# Patient Record
Sex: FEMALE | Race: WHITE | NOT HISPANIC OR LATINO | Employment: FULL TIME | URBAN - METROPOLITAN AREA
[De-identification: names, ages, dates, MRNs, and addresses within clinical notes are randomized per-mention and may not be internally consistent; named-entity substitution may affect disease eponyms.]

---

## 2017-06-16 ENCOUNTER — HOSPITAL ENCOUNTER (OUTPATIENT)
Dept: LAB | Facility: HOSPITAL | Age: 47
Setting detail: SPECIMEN
Discharge: HOME | End: 2017-06-16
Attending: INTERNAL MEDICINE | Admitting: INTERNAL MEDICINE

## 2017-06-16 LAB
ALBUMIN SERPL-MCNC: 4 G/DL (ref 3.5–4.8)
ALBUMIN/GLOB SERPL: 1.4 {RATIO} (ref 1–1.7)
ALP SERPL-CCNC: 45 IU/L (ref 32–91)
ALT SERPL-CCNC: 15 IU/L (ref 14–54)
ANION GAP SERPL CALC-SCNC: 12.6 MMOL/L (ref 10–20)
AST SERPL-CCNC: 20 IU/L (ref 15–41)
BILIRUB SERPL-MCNC: 0.9 MG/DL (ref 0.3–1.2)
BUN SERPL-MCNC: 7 MG/DL (ref 8–20)
BUN/CREAT SERPL: 7.8 (ref 5.4–26.2)
CALCIUM SERPL-MCNC: 9.3 MG/DL (ref 8.9–10.3)
CHLORIDE SERPL-SCNC: 104 MMOL/L (ref 101–111)
CHOLEST SERPL-MCNC: 154 MG/DL
CHOLEST/HDLC SERPL: 3 {RATIO}
CONV CO2: 27 MMOL/L (ref 22–32)
CONV LDL CHOLESTEROL DIRECT: 93 MG/DL (ref 0–100)
CONV MICROALBUM.,U,RANDOM: 9 MG/L
CONV TOTAL PROTEIN: 6.9 G/DL (ref 6.1–7.9)
CREAT 24H UR-MCNC: 254.7 MG/DL
CREAT UR-MCNC: 0.9 MG/DL (ref 0.4–1)
GLOBULIN UR ELPH-MCNC: 2.9 G/DL (ref 2.5–3.8)
GLUCOSE SERPL-MCNC: 114 MG/DL (ref 65–99)
HDLC SERPL-MCNC: 51 MG/DL
LDLC/HDLC SERPL: 1.8 {RATIO}
LIPID INTERPRETATION: NORMAL
MICROALBUMIN/CREAT UR: 3.5 UG/MG
POTASSIUM SERPL-SCNC: 3.6 MMOL/L (ref 3.6–5.1)
SODIUM SERPL-SCNC: 140 MMOL/L (ref 136–144)
TRIGL SERPL-MCNC: 66 MG/DL
TSH SERPL-ACNC: 1.39 UIU/ML (ref 0.34–5.6)
VLDLC SERPL CALC-MCNC: 10.4 MG/DL

## 2017-06-26 ENCOUNTER — CONVERSION ENCOUNTER (OUTPATIENT)
Dept: ENDOCRINOLOGY | Facility: CLINIC | Age: 47
End: 2017-06-26

## 2017-12-11 ENCOUNTER — HOSPITAL ENCOUNTER (OUTPATIENT)
Dept: LAB | Facility: HOSPITAL | Age: 47
Setting detail: SPECIMEN
Discharge: HOME | End: 2017-12-11
Attending: INTERNAL MEDICINE | Admitting: INTERNAL MEDICINE

## 2017-12-18 ENCOUNTER — CONVERSION ENCOUNTER (OUTPATIENT)
Dept: ENDOCRINOLOGY | Facility: CLINIC | Age: 47
End: 2017-12-18

## 2018-09-25 ENCOUNTER — HOSPITAL ENCOUNTER (OUTPATIENT)
Dept: LAB | Facility: HOSPITAL | Age: 48
Setting detail: SPECIMEN
Discharge: HOME | End: 2018-09-25
Attending: INTERNAL MEDICINE | Admitting: INTERNAL MEDICINE

## 2018-09-25 LAB
ALBUMIN SERPL-MCNC: 3.9 G/DL (ref 3.5–4.8)
ALBUMIN/GLOB SERPL: 1.3 {RATIO} (ref 1–1.7)
ALP SERPL-CCNC: 50 IU/L (ref 32–91)
ALT SERPL-CCNC: 15 IU/L (ref 14–54)
ANION GAP SERPL CALC-SCNC: 11.1 MMOL/L (ref 10–20)
AST SERPL-CCNC: 19 IU/L (ref 15–41)
BILIRUB SERPL-MCNC: 0.6 MG/DL (ref 0.3–1.2)
BUN SERPL-MCNC: 10 MG/DL (ref 8–20)
BUN/CREAT SERPL: 12.5 (ref 5.4–26.2)
CALCIUM SERPL-MCNC: 9 MG/DL (ref 8.9–10.3)
CHLORIDE SERPL-SCNC: 97 MMOL/L (ref 101–111)
CHOLEST SERPL-MCNC: 173 MG/DL
CHOLEST/HDLC SERPL: 3.6 {RATIO}
CONV CO2: 28 MMOL/L (ref 22–32)
CONV LDL CHOLESTEROL DIRECT: 109 MG/DL (ref 0–100)
CONV MICROALBUM.,U,RANDOM: 5 MG/L
CONV TOTAL PROTEIN: 6.8 G/DL (ref 6.1–7.9)
CREAT 24H UR-MCNC: 101 MG/DL
CREAT UR-MCNC: 0.8 MG/DL (ref 0.4–1)
GLOBULIN UR ELPH-MCNC: 2.9 G/DL (ref 2.5–3.8)
GLUCOSE SERPL-MCNC: 219 MG/DL (ref 65–99)
HDLC SERPL-MCNC: 47 MG/DL
LDLC/HDLC SERPL: 2.3 {RATIO}
LIPID INTERPRETATION: ABNORMAL
MICROALBUMIN/CREAT UR: 5 UG/MG
POTASSIUM SERPL-SCNC: 4.1 MMOL/L (ref 3.6–5.1)
SODIUM SERPL-SCNC: 132 MMOL/L (ref 136–144)
TRIGL SERPL-MCNC: 111 MG/DL
VLDLC SERPL CALC-MCNC: 16.2 MG/DL

## 2018-10-02 ENCOUNTER — CONVERSION ENCOUNTER (OUTPATIENT)
Dept: ENDOCRINOLOGY | Facility: CLINIC | Age: 48
End: 2018-10-02

## 2019-03-26 ENCOUNTER — HOSPITAL ENCOUNTER (OUTPATIENT)
Dept: LAB | Facility: HOSPITAL | Age: 49
Setting detail: SPECIMEN
Discharge: HOME | End: 2019-03-26
Attending: INTERNAL MEDICINE | Admitting: INTERNAL MEDICINE

## 2019-04-02 ENCOUNTER — CONVERSION ENCOUNTER (OUTPATIENT)
Dept: ENDOCRINOLOGY | Facility: CLINIC | Age: 49
End: 2019-04-02

## 2019-06-04 VITALS
SYSTOLIC BLOOD PRESSURE: 128 MMHG | SYSTOLIC BLOOD PRESSURE: 130 MMHG | WEIGHT: 187 LBS | HEIGHT: 66 IN | OXYGEN SATURATION: 98 % | HEART RATE: 99 BPM | DIASTOLIC BLOOD PRESSURE: 70 MMHG | DIASTOLIC BLOOD PRESSURE: 77 MMHG | HEIGHT: 66 IN | BODY MASS INDEX: 29.89 KG/M2 | DIASTOLIC BLOOD PRESSURE: 79 MMHG | HEART RATE: 103 BPM | WEIGHT: 178 LBS | OXYGEN SATURATION: 100 % | SYSTOLIC BLOOD PRESSURE: 120 MMHG | OXYGEN SATURATION: 98 % | SYSTOLIC BLOOD PRESSURE: 117 MMHG | HEART RATE: 81 BPM | WEIGHT: 181 LBS | DIASTOLIC BLOOD PRESSURE: 82 MMHG | HEART RATE: 89 BPM | OXYGEN SATURATION: 98 % | BODY MASS INDEX: 30.05 KG/M2 | WEIGHT: 186 LBS

## 2019-09-16 ENCOUNTER — TELEPHONE (OUTPATIENT)
Dept: ENDOCRINOLOGY | Facility: CLINIC | Age: 49
End: 2019-09-16

## 2019-09-16 NOTE — TELEPHONE ENCOUNTER
PATIENT CALLED TO GET HER LABS [facility]ENT [facility]  TO FAX HER LABS ORDERS PATIENT DID NOT KNOW FAX NUMBER SO I CALLED THE THEM  YESTERDAY AND TODAY  [phone] AND TODAY 09/17/2019

## 2019-09-18 ENCOUNTER — TELEPHONE (OUTPATIENT)
Dept: ENDOCRINOLOGY | Facility: CLINIC | Age: 49
End: 2019-09-18

## 2019-09-18 NOTE — TELEPHONE ENCOUNTER
PATIENT WANTED ME TO FAX LAB ORDERS TO [id number]  THE LAB DID CALL ME BACK AND GAVE ME THE FAX NUMBER  AND I FAX THE PATIENT'S  LAB ORDERS WITH COVER SHEET SENT 3 PAGES WAS SENT SUCCESSFUL ON 09/18/2019 AT 8:10AM

## 2019-09-19 DIAGNOSIS — I10 HYPERTENSION, UNSPECIFIED TYPE: ICD-10-CM

## 2019-09-19 DIAGNOSIS — E78.5 HYPERLIPIDEMIA, UNSPECIFIED HYPERLIPIDEMIA TYPE: ICD-10-CM

## 2019-09-19 DIAGNOSIS — E10.65 TYPE 1 DIABETES MELLITUS WITH HYPERGLYCEMIA (HCC): Primary | ICD-10-CM

## 2019-09-19 PROBLEM — Z23 ENCOUNTER FOR IMMUNIZATION: Status: ACTIVE | Noted: 2018-10-02

## 2019-09-21 ENCOUNTER — LAB (OUTPATIENT)
Dept: LAB | Facility: HOSPITAL | Age: 49
End: 2019-09-21

## 2019-09-21 DIAGNOSIS — I10 HYPERTENSION, UNSPECIFIED TYPE: ICD-10-CM

## 2019-09-21 DIAGNOSIS — E10.65 TYPE 1 DIABETES MELLITUS WITH HYPERGLYCEMIA (HCC): ICD-10-CM

## 2019-09-21 DIAGNOSIS — E78.5 HYPERLIPIDEMIA, UNSPECIFIED HYPERLIPIDEMIA TYPE: ICD-10-CM

## 2019-09-21 LAB
ALBUMIN SERPL-MCNC: 3.7 G/DL (ref 3.5–4.8)
ALBUMIN UR-MCNC: 6 MG/L
ALBUMIN/GLOB SERPL: 1.3 G/DL (ref 1–1.7)
ALP SERPL-CCNC: 48 U/L (ref 32–91)
ALT SERPL W P-5'-P-CCNC: 17 U/L (ref 14–54)
ANION GAP SERPL CALCULATED.3IONS-SCNC: 11.4 MMOL/L (ref 5–15)
ARTICHOKE IGE QN: 97 MG/DL (ref 0–100)
AST SERPL-CCNC: 16 U/L (ref 15–41)
BILIRUB SERPL-MCNC: 0.8 MG/DL (ref 0.3–1.2)
BUN BLD-MCNC: 5 MG/DL (ref 8–20)
BUN/CREAT SERPL: 5.6 (ref 5.4–26.2)
CALCIUM SPEC-SCNC: 8.5 MG/DL (ref 8.9–10.3)
CHLORIDE SERPL-SCNC: 101 MMOL/L (ref 101–111)
CHOLEST SERPL-MCNC: 150 MG/DL
CO2 SERPL-SCNC: 29 MMOL/L (ref 22–32)
CREAT BLD-MCNC: 0.9 MG/DL (ref 0.4–1)
CREAT UR-MCNC: 136.9 MG/DL
GFR SERPL CREATININE-BSD FRML MDRD: 67 ML/MIN/1.73
GLOBULIN UR ELPH-MCNC: 2.9 GM/DL (ref 2.5–3.8)
GLUCOSE BLD-MCNC: 217 MG/DL (ref 65–99)
HDLC SERPL QL: 3.06
HDLC SERPL-MCNC: 49 MG/DL
LDLC/HDLC SERPL: 1.84 {RATIO}
MICROALBUMIN/CREAT UR: 4.4 UG/MG
POTASSIUM BLD-SCNC: 4.4 MMOL/L (ref 3.6–5.1)
PROT SERPL-MCNC: 6.6 G/DL (ref 6.1–7.9)
SODIUM BLD-SCNC: 137 MMOL/L (ref 136–144)
TRIGL SERPL-MCNC: 53 MG/DL
TSH SERPL DL<=0.05 MIU/L-ACNC: 0.56 UIU/ML (ref 0.34–5.6)
VLDLC SERPL-MCNC: 10.6 MG/DL

## 2019-09-21 PROCEDURE — 80053 COMPREHEN METABOLIC PANEL: CPT

## 2019-09-21 PROCEDURE — 82570 ASSAY OF URINE CREATININE: CPT

## 2019-09-21 PROCEDURE — 84443 ASSAY THYROID STIM HORMONE: CPT

## 2019-09-21 PROCEDURE — 82043 UR ALBUMIN QUANTITATIVE: CPT

## 2019-09-21 PROCEDURE — 83036 HEMOGLOBIN GLYCOSYLATED A1C: CPT

## 2019-09-21 PROCEDURE — 80061 LIPID PANEL: CPT

## 2019-09-21 PROCEDURE — 36415 COLL VENOUS BLD VENIPUNCTURE: CPT

## 2019-09-23 LAB — HBA1C MFR BLD: 8.5 % (ref 3.5–5.6)

## 2019-09-24 RX ORDER — ATORVASTATIN CALCIUM 10 MG/1
TABLET, FILM COATED ORAL
Qty: 90 TABLET | Refills: 0 | Status: SHIPPED | OUTPATIENT
Start: 2019-09-24 | End: 2019-12-26

## 2019-10-02 ENCOUNTER — OFFICE VISIT (OUTPATIENT)
Dept: ENDOCRINOLOGY | Facility: CLINIC | Age: 49
End: 2019-10-02

## 2019-10-02 VITALS
DIASTOLIC BLOOD PRESSURE: 72 MMHG | OXYGEN SATURATION: 97 % | HEART RATE: 103 BPM | WEIGHT: 183 LBS | SYSTOLIC BLOOD PRESSURE: 114 MMHG | HEIGHT: 66 IN | BODY MASS INDEX: 29.41 KG/M2

## 2019-10-02 DIAGNOSIS — E78.5 HYPERLIPIDEMIA, UNSPECIFIED HYPERLIPIDEMIA TYPE: ICD-10-CM

## 2019-10-02 DIAGNOSIS — E55.9 VITAMIN D DEFICIENCY: ICD-10-CM

## 2019-10-02 DIAGNOSIS — E10.65 TYPE 1 DIABETES MELLITUS WITH HYPERGLYCEMIA (HCC): Primary | ICD-10-CM

## 2019-10-02 DIAGNOSIS — Z96.41 INSULIN PUMP STATUS: ICD-10-CM

## 2019-10-02 LAB — GLUCOSE BLDC GLUCOMTR-MCNC: 202 MG/DL (ref 70–130)

## 2019-10-02 PROCEDURE — 99214 OFFICE O/P EST MOD 30 MIN: CPT | Performed by: INTERNAL MEDICINE

## 2019-10-02 PROCEDURE — 82962 GLUCOSE BLOOD TEST: CPT | Performed by: INTERNAL MEDICINE

## 2019-10-02 RX ORDER — LISINOPRIL 10 MG/1
10 TABLET ORAL DAILY
COMMUNITY
Start: 2019-08-08

## 2019-10-02 RX ORDER — SYRINGE-NEEDLE,INSULIN,0.5 ML 27GX1/2"
SYRINGE, EMPTY DISPOSABLE MISCELLANEOUS
COMMUNITY
Start: 2016-06-21 | End: 2022-08-09

## 2019-10-02 RX ORDER — FLASH GLUCOSE SENSOR
1 KIT MISCELLANEOUS CONTINUOUS
Qty: 2 EACH | Refills: 12 | Status: SHIPPED | OUTPATIENT
Start: 2019-10-02 | End: 2020-06-24

## 2019-10-02 RX ORDER — FEXOFENADINE HCL 180 MG/1
180 TABLET ORAL DAILY
COMMUNITY

## 2019-10-02 RX ORDER — MONTELUKAST SODIUM 10 MG/1
10 TABLET ORAL NIGHTLY
COMMUNITY

## 2019-10-02 RX ORDER — FLASH GLUCOSE SCANNING READER
1 EACH MISCELLANEOUS CONTINUOUS
Qty: 1 DEVICE | Refills: 1 | Status: SHIPPED | OUTPATIENT
Start: 2019-10-02 | End: 2020-06-24

## 2019-10-02 NOTE — PATIENT INSTRUCTIONS
See eye doctor.  Increase exercise as planned.  Change ICR to 1:4 for breakfast & lunch.  Call if blood sugars are running under 100 or over 200.  F/u in 6 months, with labs prior.

## 2019-12-26 RX ORDER — ATORVASTATIN CALCIUM 10 MG/1
TABLET, FILM COATED ORAL
Qty: 90 TABLET | Refills: 1 | Status: SHIPPED | OUTPATIENT
Start: 2019-12-26 | End: 2020-07-13

## 2020-03-09 DIAGNOSIS — E10.65 TYPE 1 DIABETES MELLITUS WITH HYPERGLYCEMIA (HCC): Primary | ICD-10-CM

## 2020-03-10 RX ORDER — PERPHENAZINE 16 MG/1
TABLET, FILM COATED ORAL
Qty: 150 EACH | Refills: 3 | Status: SHIPPED | OUTPATIENT
Start: 2020-03-10 | End: 2020-12-21

## 2020-05-18 ENCOUNTER — TELEPHONE (OUTPATIENT)
Dept: ENDOCRINOLOGY | Facility: CLINIC | Age: 50
End: 2020-05-18

## 2020-06-02 ENCOUNTER — TELEPHONE (OUTPATIENT)
Dept: ENDOCRINOLOGY | Facility: CLINIC | Age: 50
End: 2020-06-02

## 2020-06-02 NOTE — TELEPHONE ENCOUNTER
Pt received her new Medtronic pump and needs training.  After discussion, pt prefers to do virtual training.  Emailed Fransico with [employer] to call and schedule virtual training with patient.  Will email pump training orders.

## 2020-06-16 ENCOUNTER — LAB (OUTPATIENT)
Dept: LAB | Facility: HOSPITAL | Age: 50
End: 2020-06-16

## 2020-06-16 DIAGNOSIS — E55.9 VITAMIN D DEFICIENCY: ICD-10-CM

## 2020-06-16 DIAGNOSIS — E10.65 TYPE 1 DIABETES MELLITUS WITH HYPERGLYCEMIA (HCC): ICD-10-CM

## 2020-06-16 LAB
25(OH)D3 SERPL-MCNC: 34 NG/ML (ref 30–100)
HBA1C MFR BLD: 7.8 % (ref 3.5–5.6)

## 2020-06-16 PROCEDURE — 82306 VITAMIN D 25 HYDROXY: CPT

## 2020-06-16 PROCEDURE — 83036 HEMOGLOBIN GLYCOSYLATED A1C: CPT

## 2020-06-16 PROCEDURE — 36415 COLL VENOUS BLD VENIPUNCTURE: CPT

## 2020-06-19 ENCOUNTER — TELEPHONE (OUTPATIENT)
Dept: ENDOCRINOLOGY | Facility: CLINIC | Age: 50
End: 2020-06-19

## 2020-06-19 NOTE — TELEPHONE ENCOUNTER
Fransico, [employer] CPT, emailed pt's recent [facility] reports after pt started new Medtronic and [employer]. Carelink reports scanned into phone note.

## 2020-06-24 ENCOUNTER — TELEMEDICINE (OUTPATIENT)
Dept: ENDOCRINOLOGY | Facility: CLINIC | Age: 50
End: 2020-06-24

## 2020-06-24 VITALS
HEIGHT: 66 IN | SYSTOLIC BLOOD PRESSURE: 135 MMHG | WEIGHT: 186 LBS | HEART RATE: 101 BPM | TEMPERATURE: 96.8 F | BODY MASS INDEX: 29.89 KG/M2 | DIASTOLIC BLOOD PRESSURE: 84 MMHG

## 2020-06-24 DIAGNOSIS — Z96.41 INSULIN PUMP STATUS: ICD-10-CM

## 2020-06-24 DIAGNOSIS — E78.5 HYPERLIPIDEMIA, UNSPECIFIED HYPERLIPIDEMIA TYPE: ICD-10-CM

## 2020-06-24 DIAGNOSIS — E10.65 TYPE 1 DIABETES MELLITUS WITH HYPERGLYCEMIA (HCC): Primary | ICD-10-CM

## 2020-06-24 DIAGNOSIS — E55.9 VITAMIN D DEFICIENCY: ICD-10-CM

## 2020-06-24 PROCEDURE — 99214 OFFICE O/P EST MOD 30 MIN: CPT | Performed by: INTERNAL MEDICINE

## 2020-06-24 NOTE — PATIENT INSTRUCTIONS
Keep up the good work!  Increase exercise as planned.  See eye doctor.  Continue meds & pump settings.  Call if blood sugars are running under 100 or over 200.  F/u in 6 months, with fasting labs prior.

## 2020-06-27 NOTE — PROGRESS NOTES
"[facility]        Patient Care Team:  Pallares, Clara Ann, MD as PCP - General    Chief Complaint:    Chief Complaint   Patient presents with   • Diabetes     TYPE 1-- BS 71 (FASTING)         Subjective   Here for diabetes f/u  This is a telemedicine visit in view of the covid 19 pandemic, using phone only due to problems with connection  Blood sugars: in the high 100's. New pump x 1 week. Less lows. 98% on auto mode  Insulin delivery per pump: Basal 55% / bolus 45%  Exercise program: walking, gym just reopened. Will restart  Due for eye exam    Interval History:     Patient Complaints: none  Patient Denies:  hypoglycemia  History taken from: patient    Review of Systems:   Review of Systems   Eyes: Negative for blurred vision.   Gastrointestinal: Negative for nausea.   Endocrine: Negative for polyuria.   Neurological: Negative for headache.     No wy change since last visit    Objective     Vital Signs      Vitals:    06/24/20 1206   BP: 135/84   Pulse: 101   Temp: 96.8 °F (36 °C)         Physical Exam: not done. eVisit          Results Review:    I have reviewed the patient's new clinical results, labs & imaging.    Medication Review:   Prior to Admission medications    Medication Sig Start Date End Date Taking? Authorizing Provider   atorvastatin (LIPITOR) 10 MG tablet TAKE ONE TABLET BY MOUTH DAILY 12/26/19  Yes Bernarda Henry MD   CONTOUR NEXT TEST test strip USE TO CHECK BLOOD SUGAR FOUR TIMES A DAY BEFORE MEALS AND AT BEDTIME 3/10/20  Yes Bernarda Henry MD   fexofenadine (ALLEGRA) 180 MG tablet Take 180 mg by mouth Daily.   Yes ProviderGiselle MD   Insulin Syringe-Needle U-100 (B-D INS SYR ULTRAFINE 1CC/31G) 31G X 5/16\" 1 ML misc BD INSULIN SYR ULTRAFINE II 31G X 5/16\" 1 ML 6/21/16  Yes Giselle Mcbride MD   lisinopril (PRINIVIL,ZESTRIL) 10 MG tablet 10 mg Daily. 8/8/19  Yes Giselle Mcbride MD   montelukast (SINGULAIR) 10 MG tablet Take 10 mg by mouth Every " Night.   Yes ProviderGiselle MD   NOVOLOG 100 UNIT/ML injection USE AS DIRECTED VIA INSULIN PUMP MAX DAILY DOSE OF 80 UNITS 5/29/20  Yes Bernarda Henry MD       Lab Results (most recent)     None        Lab Results   Component Value Date    HGBA1C 7.8 (H) 06/16/2020    HGBA1C 8.5 (H) 09/21/2019      Lab Results   Component Value Date    GLUCOSE 217 (H) 09/21/2019    BUN 5 (L) 09/21/2019    CREATININE 0.90 09/21/2019    EGFRIFNONA 67 09/21/2019    BCR 5.6 09/21/2019    K 4.4 09/21/2019    CO2 29.0 09/21/2019    CALCIUM 8.5 (L) 09/21/2019    ALBUMIN 3.70 09/21/2019    LABIL2 1.3 09/25/2018    AST 16 09/21/2019    ALT 17 09/21/2019    CHOL 150 09/21/2019    LDL 97 09/21/2019    HDL 49 09/21/2019    TRIG 53 09/21/2019     Lab Results   Component Value Date    TSH 0.560 09/21/2019    [id number] 34.0 06/16/2020       Assessment/Plan     Caryn was seen today for diabetes.    Diagnoses and all orders for this visit:    Type 1 diabetes mellitus with hyperglycemia (CMS/[facility])  -     Hemoglobin A1c; Future  -     Microalbumin / Creatinine Urine Ratio - Urine, Clean Catch; Future    Hyperlipidemia, unspecified hyperlipidemia type  -     Comprehensive Metabolic Panel; Future  -     Lipid Panel; Future  -     TSH; Future    Vitamin D deficiency    Insulin pump status    Wearing a 670G MiniMed insulin pump with sensorssince June 19th, 2020.    Increase exercise as planned.  See eye doctor.  Continue meds & pump settings.  Call if blood sugars are running under 100 or over 200.    Spent 25 min talking to pt.    Bernarda Henry MD  06/27/20  12:07

## 2020-07-13 RX ORDER — ATORVASTATIN CALCIUM 10 MG/1
TABLET, FILM COATED ORAL
Qty: 90 TABLET | Refills: 2 | Status: SHIPPED | OUTPATIENT
Start: 2020-07-13

## 2020-10-29 ENCOUNTER — TELEPHONE (OUTPATIENT)
Dept: ENDOCRINOLOGY | Facility: CLINIC | Age: 50
End: 2020-10-29

## 2020-10-29 NOTE — TELEPHONE ENCOUNTER
Patient called and is requesting a letter for work stating that she has a diagnosis of diabetes type 1. Please  Advise if this is ok and if youd like to add anything ?

## 2020-12-15 ENCOUNTER — LAB (OUTPATIENT)
Dept: LAB | Facility: HOSPITAL | Age: 50
End: 2020-12-15

## 2020-12-15 DIAGNOSIS — E78.5 HYPERLIPIDEMIA, UNSPECIFIED HYPERLIPIDEMIA TYPE: ICD-10-CM

## 2020-12-15 DIAGNOSIS — E10.65 TYPE 1 DIABETES MELLITUS WITH HYPERGLYCEMIA (HCC): ICD-10-CM

## 2020-12-15 LAB
ALBUMIN SERPL-MCNC: 4 G/DL (ref 3.5–5.2)
ALBUMIN UR-MCNC: <1.2 MG/DL
ALBUMIN/GLOB SERPL: 1.3 G/DL
ALP SERPL-CCNC: 66 U/L (ref 39–117)
ALT SERPL W P-5'-P-CCNC: 16 U/L (ref 1–33)
ANION GAP SERPL CALCULATED.3IONS-SCNC: 8.9 MMOL/L (ref 5–15)
AST SERPL-CCNC: 17 U/L (ref 1–32)
BILIRUB SERPL-MCNC: 0.3 MG/DL (ref 0–1.2)
BUN SERPL-MCNC: 7 MG/DL (ref 6–20)
BUN/CREAT SERPL: 8.5 (ref 7–25)
CALCIUM SPEC-SCNC: 8.9 MG/DL (ref 8.6–10.5)
CHLORIDE SERPL-SCNC: 101 MMOL/L (ref 98–107)
CHOLEST SERPL-MCNC: 157 MG/DL (ref 0–200)
CO2 SERPL-SCNC: 27.1 MMOL/L (ref 22–29)
CREAT SERPL-MCNC: 0.82 MG/DL (ref 0.57–1)
CREAT UR-MCNC: 132.4 MG/DL
GFR SERPL CREATININE-BSD FRML MDRD: 74 ML/MIN/1.73
GLOBULIN UR ELPH-MCNC: 3.1 GM/DL
GLUCOSE SERPL-MCNC: 190 MG/DL (ref 65–99)
HBA1C MFR BLD: 7.5 % (ref 3.5–5.6)
HDLC SERPL-MCNC: 43 MG/DL (ref 40–60)
LDLC SERPL CALC-MCNC: 93 MG/DL (ref 0–100)
LDLC/HDLC SERPL: 2.1 {RATIO}
MICROALBUMIN/CREAT UR: NORMAL MG/G{CREAT}
POTASSIUM SERPL-SCNC: 4.3 MMOL/L (ref 3.5–5.2)
PROT SERPL-MCNC: 7.1 G/DL (ref 6–8.5)
SODIUM SERPL-SCNC: 137 MMOL/L (ref 136–145)
TRIGL SERPL-MCNC: 118 MG/DL (ref 0–150)
TSH SERPL DL<=0.05 MIU/L-ACNC: 0.75 UIU/ML (ref 0.27–4.2)
VLDLC SERPL-MCNC: 21 MG/DL (ref 5–40)

## 2020-12-15 PROCEDURE — 80061 LIPID PANEL: CPT

## 2020-12-15 PROCEDURE — 80053 COMPREHEN METABOLIC PANEL: CPT

## 2020-12-15 PROCEDURE — 82570 ASSAY OF URINE CREATININE: CPT

## 2020-12-15 PROCEDURE — 83036 HEMOGLOBIN GLYCOSYLATED A1C: CPT

## 2020-12-15 PROCEDURE — 84443 ASSAY THYROID STIM HORMONE: CPT

## 2020-12-15 PROCEDURE — 82043 UR ALBUMIN QUANTITATIVE: CPT

## 2020-12-15 PROCEDURE — 36415 COLL VENOUS BLD VENIPUNCTURE: CPT

## 2020-12-20 DIAGNOSIS — E10.65 TYPE 1 DIABETES MELLITUS WITH HYPERGLYCEMIA (HCC): ICD-10-CM

## 2020-12-21 ENCOUNTER — OFFICE VISIT (OUTPATIENT)
Dept: ENDOCRINOLOGY | Facility: CLINIC | Age: 50
End: 2020-12-21

## 2020-12-21 VITALS
HEIGHT: 66 IN | BODY MASS INDEX: 32.78 KG/M2 | SYSTOLIC BLOOD PRESSURE: 126 MMHG | WEIGHT: 204 LBS | TEMPERATURE: 96.9 F | DIASTOLIC BLOOD PRESSURE: 74 MMHG | HEART RATE: 94 BPM | OXYGEN SATURATION: 98 %

## 2020-12-21 DIAGNOSIS — E10.65 TYPE 1 DIABETES MELLITUS WITH HYPERGLYCEMIA (HCC): Primary | ICD-10-CM

## 2020-12-21 DIAGNOSIS — Z96.41 INSULIN PUMP STATUS: ICD-10-CM

## 2020-12-21 DIAGNOSIS — E55.9 VITAMIN D DEFICIENCY: ICD-10-CM

## 2020-12-21 DIAGNOSIS — E78.5 HYPERLIPIDEMIA, UNSPECIFIED HYPERLIPIDEMIA TYPE: ICD-10-CM

## 2020-12-21 LAB — GLUCOSE BLDC GLUCOMTR-MCNC: 121 MG/DL (ref 70–105)

## 2020-12-21 PROCEDURE — 82962 GLUCOSE BLOOD TEST: CPT | Performed by: INTERNAL MEDICINE

## 2020-12-21 PROCEDURE — 99214 OFFICE O/P EST MOD 30 MIN: CPT | Performed by: INTERNAL MEDICINE

## 2020-12-21 RX ORDER — PERPHENAZINE 16 MG/1
TABLET, FILM COATED ORAL
Qty: 150 EACH | Refills: 12 | Status: SHIPPED | OUTPATIENT
Start: 2020-12-21 | End: 2022-01-13

## 2020-12-21 NOTE — PATIENT INSTRUCTIONS
Keep up the good work!  Increase exercise as planned..  Continue Chol meds & vit D supplements.  Increase basal rate to 1.7 units/h from 12p-3p.  Call if blood sugars are running under 100 or over 200.  F/u in 6 months, with labs prior.

## 2020-12-28 NOTE — PROGRESS NOTES
[facility]        Patient Care Team:  Pallares, Clara Ann, MD as PCP - General    Chief Complaint:    Chief Complaint   Patient presents with   • Diabetes     type 1, bs 121,pp 1.5 hrs, basal rates 12a 1.40, 3a 2.05, 6a 1.10, 9a 1.90, 12p 1.50, 3p 1.70, 7p 2.00          Subjective   Here for diabetes f/u  Blood sugars: higher in the afternoon  Insulin delivery per pump: Basal 60% / bolus 40%  Exercise program: less. Working from home  Taking vit D    Interval History:     Patient Complaints: none  Patient Denies:  hypoglycemia  History taken from: patient    Review of Systems:   Review of Systems   Constitutional: Positive for unexpected weight gain.   Eyes: Negative for blurred vision.   Gastrointestinal: Negative for nausea.   Endocrine: Negative for polyuria.   Neurological: Negative for headache.     Gained 18 lb since last visit    Objective     Vital Signs      Vitals:    12/21/20 1259   BP: 126/74   Pulse: 94   Temp: 96.9 °F (36.1 °C)   SpO2: 98%         Physical Exam:     General Appearance:    Alert, cooperative, in no acute distress. Obese   Head:    Normocephalic, without obvious abnormality, atraumatic   Eyes:            Lids and lashes normal, conjunctivae and sclerae normal, no   icterus, no pallor, corneas clear, PERRLA   Throat:   No oral lesions,  oral mucosa moist   Neck:   No adenopathy, supple,  no thyromegaly, no   carotid bruit   Lungs:     Clear     Heart:    Regular rhythm and normal rate   Chest Wall:    No abnormalities observed   Abdomen:     Normal bowel sounds, soft                 Extremities:   Moves all extremities well, no edema               Pulses:   Pulses palpable and equal bilaterally   Skin:   Pump site clean   Neurologic:  DTR 1+ in ankles, normal vibratory sense, able to feel the 10g monofilament          Results Review:    I have reviewed the patient's new clinical results, labs & imaging.    Medication Review:   Prior to Admission medications   "  Medication Sig Start Date End Date Taking? Authorizing Provider   atorvastatin (LIPITOR) 10 MG tablet TAKE ONE TABLET BY MOUTH DAILY 7/13/20  Yes Bernarda Henry MD   fexofenadine (ALLEGRA) 180 MG tablet Take 180 mg by mouth Daily.   Yes Giselle Mcbride MD   Insulin Syringe-Needle U-100 (B-D INS SYR ULTRAFINE 1CC/31G) 31G X 5/16\" 1 ML misc BD INSULIN SYR ULTRAFINE II 31G X 5/16\" 1 ML 6/21/16  Yes Giselle Mcbride MD   lisinopril (PRINIVIL,ZESTRIL) 10 MG tablet 10 mg Daily. 8/8/19  Yes Giselle Mcbride MD   montelukast (SINGULAIR) 10 MG tablet Take 10 mg by mouth Every Night.   Yes Giselle Mcbride MD   NovoLOG 100 UNIT/ML injection USE AS DIRECTED VIA INSULIN PUMP; MAXIMUM DOSE OF 80 UNITS PER DAY 12/16/20  Yes Bernarda Henry MD   Contour Next Test test strip CHECK BLOOD SUGAR FOUR TIMES A DAY BEFORE MEALS AND AT BEDTIME 12/21/20   Bernarda Henry MD       Lab Results (most recent)     Procedure Component Value Units Date/Time    POC Glucose [[id number]]  (Abnormal) Collected: 12/21/20 1257    Specimen: Blood Updated: 12/21/20 1300     Glucose 121 mg/dL      Comment: Serial Number: [id number]:  [id number]               Lab Results   Component Value Date    HGBA1C 7.5 (H) 12/15/2020    HGBA1C 7.8 (H) 06/16/2020    HGBA1C 8.5 (H) 09/21/2019      Lab Results   Component Value Date    GLUCOSE 190 (H) 12/15/2020    BUN 7 12/15/2020    CREATININE 0.82 12/15/2020    EGFRIFNONA 74 12/15/2020    BCR 8.5 12/15/2020    K 4.3 12/15/2020    CO2 27.1 12/15/2020    CALCIUM 8.9 12/15/2020    ALBUMIN 4.00 12/15/2020    LABIL2 1.3 09/25/2018    AST 17 12/15/2020    ALT 16 12/15/2020    CHOL 157 12/15/2020    LDL 93 12/15/2020    HDL 43 12/15/2020    TRIG 118 12/15/2020     Lab Results   Component Value Date    TSH 0.746 12/15/2020    [id number] 34.0 06/16/2020       Assessment/Plan     Diagnoses and all orders for this visit:    1. Type 1 diabetes mellitus with hyperglycemia (CMS/[facility]) (Primary) - " improved  -     Hemoglobin A1c; Future    2. Vitamin D deficiency - will check status next visit  -     Vitamin D 25 Hydroxy; Future    3. Hyperlipidemia, unspecified hyperlipidemia type - stable    4. Insulin pump status    Other orders  -     POC Glucose    Wearing a 670G MiniMed insulin pump with sensors since June 19th, 2020.    Increase exercise as planned..  Continue chol meds & vit D supplements.  Increase basal rate to 1.7 units/h from 12p-3p.  Call if blood sugars are running under 100 or over 200.        Bernarda Henry MD  12/27/20  19:04 EST

## 2021-03-20 NOTE — TELEPHONE ENCOUNTER
Patient called for NovoLog refills, refill for NovoLog while sent to [facility] in [facility].  Patient notified.

## 2021-04-02 ENCOUNTER — TELEPHONE (OUTPATIENT)
Dept: ENDOCRINOLOGY | Facility: CLINIC | Age: 51
End: 2021-04-02

## 2021-04-02 ENCOUNTER — BULK ORDERING (OUTPATIENT)
Dept: CASE MANAGEMENT | Facility: OTHER | Age: 51
End: 2021-04-02

## 2021-04-02 DIAGNOSIS — Z23 IMMUNIZATION DUE: ICD-10-CM

## 2021-04-21 ENCOUNTER — TELEPHONE (OUTPATIENT)
Dept: ENDOCRINOLOGY | Facility: CLINIC | Age: 51
End: 2021-04-21

## 2021-04-21 NOTE — TELEPHONE ENCOUNTER
Scanned into phone note. Talked with pt about issues she has been having with her pump (error messages, having to change her infusion set 3-4 times a day). Advised pt call [employer] and explain the issue.

## 2021-05-19 ENCOUNTER — PATIENT MESSAGE (OUTPATIENT)
Dept: ENDOCRINOLOGY | Facility: CLINIC | Age: 51
End: 2021-05-19

## 2021-05-19 DIAGNOSIS — E10.65 UNCONTROLLED TYPE 1 DIABETES MELLITUS WITH HYPERGLYCEMIA (HCC): Primary | ICD-10-CM

## 2021-05-29 DIAGNOSIS — E10.65 UNCONTROLLED TYPE 1 DIABETES MELLITUS WITH HYPERGLYCEMIA (HCC): ICD-10-CM

## 2021-06-14 ENCOUNTER — LAB (OUTPATIENT)
Dept: LAB | Facility: HOSPITAL | Age: 51
End: 2021-06-14

## 2021-06-14 DIAGNOSIS — E10.65 TYPE 1 DIABETES MELLITUS WITH HYPERGLYCEMIA (HCC): ICD-10-CM

## 2021-06-14 DIAGNOSIS — E55.9 VITAMIN D DEFICIENCY: ICD-10-CM

## 2021-06-14 LAB
25(OH)D3 SERPL-MCNC: 36.5 NG/ML
HBA1C MFR BLD: 7.6 % (ref 3.5–5.6)

## 2021-06-14 PROCEDURE — 83036 HEMOGLOBIN GLYCOSYLATED A1C: CPT

## 2021-06-14 PROCEDURE — 82306 VITAMIN D 25 HYDROXY: CPT

## 2021-06-14 PROCEDURE — 36415 COLL VENOUS BLD VENIPUNCTURE: CPT

## 2021-06-21 ENCOUNTER — OFFICE VISIT (OUTPATIENT)
Dept: ENDOCRINOLOGY | Facility: CLINIC | Age: 51
End: 2021-06-21

## 2021-06-21 VITALS
DIASTOLIC BLOOD PRESSURE: 70 MMHG | TEMPERATURE: 97.3 F | OXYGEN SATURATION: 98 % | HEIGHT: 66 IN | BODY MASS INDEX: 32.47 KG/M2 | HEART RATE: 108 BPM | WEIGHT: 202 LBS | SYSTOLIC BLOOD PRESSURE: 110 MMHG

## 2021-06-21 DIAGNOSIS — E78.5 HYPERLIPIDEMIA, UNSPECIFIED HYPERLIPIDEMIA TYPE: ICD-10-CM

## 2021-06-21 DIAGNOSIS — Z96.41 INSULIN PUMP STATUS: ICD-10-CM

## 2021-06-21 DIAGNOSIS — E55.9 VITAMIN D DEFICIENCY: ICD-10-CM

## 2021-06-21 DIAGNOSIS — E10.65 TYPE 1 DIABETES MELLITUS WITH HYPERGLYCEMIA (HCC): Primary | ICD-10-CM

## 2021-06-21 LAB — GLUCOSE BLDC GLUCOMTR-MCNC: 176 MG/DL (ref 70–105)

## 2021-06-21 PROCEDURE — 82962 GLUCOSE BLOOD TEST: CPT | Performed by: INTERNAL MEDICINE

## 2021-06-21 PROCEDURE — 99214 OFFICE O/P EST MOD 30 MIN: CPT | Performed by: INTERNAL MEDICINE

## 2021-06-21 NOTE — PATIENT INSTRUCTIONS
Keep up the good work!  See eye doctor as scheduled.  Continue exercise.  Continue chol meds & vit D supplements.  Decrease basal rate to 1.5 units/h from 7p-12a.  Bolus 5-10 min before eating.  Call if blood sugars are running under 100 or over 200.  F/u in 6 months, with fasting labs prior.

## 2021-06-30 NOTE — PROGRESS NOTES
[facility]        Patient Care Team:  Pallares, Clara Ann, MD as PCP - General    Chief Complaint:    Chief Complaint   Patient presents with   • Diabetes     Type 1,  1.5hr PP, Basal 12am 1.4, 3am 2.05, 6am 1.1, 9am 1.9, 12pm 1.7, 7pm 2.0         Subjective   Here for diabetes f/u  Blood sugars: some lows ~ 1-2 am  Exercise program: walking  Taking vit D  Due for eye exam    Interval History:     Patient Complaints: none  Patient Denies:  Severe hypoglycemia  History taken from: patient    Review of Systems:   Review of Systems   Eyes: Negative for blurred vision.   Gastrointestinal: Negative for nausea.   Endocrine: Negative for polyuria.   Neurological: Negative for headache.     Lost  2 lb since last visit    Objective     Vital Signs      Vitals:    06/21/21 1323   BP: 110/70   Pulse: 108   Temp: 97.3 °F (36.3 °C)   SpO2: 98%         Physical Exam:     General Appearance:    Alert, cooperative, in no acute distress. Obese   Head:    Normocephalic, without obvious abnormality, atraumatic   Eyes:            Lids and lashes normal, conjunctivae and sclerae normal, no   icterus, no pallor, corneas clear, PERRLA   Throat:   No oral lesions,  oral mucosa moist   Neck:   No adenopathy, supple,  no thyromegaly, no   carotid bruit   Lungs:     Clear    Heart:    Regular rhythm and normal rate   Chest Wall:    No abnormalities observed   Abdomen:     Normal bowel sounds, soft                 Extremities:   Moves all extremities well, no edema               Pulses:   Pulses palpable and equal bilaterally   Skin:   Dry. No bruising or rash   Neurologic:  DTR 1+, able to feel the 10g monofilament          Results Review:    I have reviewed the patient's new clinical results, labs & imaging.    Medication Review:   Prior to Admission medications    Medication Sig Start Date End Date Taking? Authorizing Provider   atorvastatin (LIPITOR) 10 MG tablet TAKE ONE TABLET BY MOUTH DAILY 7/13/20  Yes  "Bernarda Henry MD   Contour Next Test test strip CHECK BLOOD SUGAR FOUR TIMES A DAY BEFORE MEALS AND AT BEDTIME 12/21/20  Yes Bernarda Henry MD   fexofenadine (ALLEGRA) 180 MG tablet Take 180 mg by mouth Daily.   Yes Giselle Mcbride MD   Insulin Syringe-Needle U-100 (B-D INS SYR ULTRAFINE 1CC/31G) 31G X 5/16\" 1 ML misc BD INSULIN SYR ULTRAFINE II 31G X 5/16\" 1 ML 6/21/16  Yes Giselle Mcbride MD   lisinopril (PRINIVIL,ZESTRIL) 10 MG tablet 10 mg Daily. 8/8/19  Yes Giselle Mcbride MD   montelukast (SINGULAIR) 10 MG tablet Take 10 mg by mouth Every Night.   Yes Giselle Mcbride MD   NovoLOG 100 UNIT/ML injection Pt to use as directed in insulin pump. MDD: 100 5/20/21  Yes Bernarda Henry MD   Nutritional Supplements (VITAMIN D BOOSTER PO) Take  by mouth.   Yes Giselle Mcbride MD       Lab Results (most recent)     Procedure Component Value Units Date/Time    POC Glucose [[id number]]  (Abnormal) Collected: 06/21/21 1327    Specimen: Blood Updated: 06/21/21 1328     Glucose 176 mg/dL      Comment: Serial Number: [id number]:  [id number]           Lab Results   Component Value Date    HGBA1C 7.6 (H) 06/14/2021    HGBA1C 7.5 (H) 12/15/2020    HGBA1C 7.8 (H) 06/16/2020      Lab Results   Component Value Date    GLUCOSE 190 (H) 12/15/2020    BUN 7 12/15/2020    CREATININE 0.82 12/15/2020    EGFRIFNONA 74 12/15/2020    BCR 8.5 12/15/2020    K 4.3 12/15/2020    CO2 27.1 12/15/2020    CALCIUM 8.9 12/15/2020    ALBUMIN 4.00 12/15/2020    LABIL2 1.3 09/25/2018    AST 17 12/15/2020    ALT 16 12/15/2020    CHOL 157 12/15/2020    LDL 93 12/15/2020    HDL 43 12/15/2020    TRIG 118 12/15/2020     Lab Results   Component Value Date    TSH 0.746 12/15/2020    MYCH76JB 36.5 06/14/2021       Assessment/Plan     Diagnoses and all orders for this visit:    1. Type 1 diabetes mellitus with hyperglycemia (CMS/[facility]) (Primary) - stable  -     Hemoglobin A1c; Future  -     Microalbumin / Creatinine " Urine Ratio - Urine, Clean Catch; Future    2. Vitamin D deficiency - stable    3. Hyperlipidemia, unspecified hyperlipidemia type - will check status next visit  -     Comprehensive Metabolic Panel; Future  -     Lipid Panel; Future  -     TSH; Future    4. Insulin pump status    Other orders  -     POC Glucose    Wearing a 670G MiniMed insulin pump with sensors since June 19th, 2020.    See eye doctor as scheduled.  Continue exercise.  Continue chol meds & vit D supplements.  Decrease basal rate to 1.5 units/h from 7p-12a.  Bolus 5-10 min before eating.  Call if blood sugars are running under 100 or over 200.        Bernarda Henry MD  06/30/21  17:11 EDT

## 2021-07-02 ENCOUNTER — TELEPHONE (OUTPATIENT)
Dept: ENDOCRINOLOGY | Facility: CLINIC | Age: 51
End: 2021-07-02

## 2021-09-24 DIAGNOSIS — E10.65 UNCONTROLLED TYPE 1 DIABETES MELLITUS WITH HYPERGLYCEMIA (HCC): ICD-10-CM

## 2021-12-22 ENCOUNTER — LAB (OUTPATIENT)
Dept: LAB | Facility: HOSPITAL | Age: 51
End: 2021-12-22

## 2021-12-22 DIAGNOSIS — E10.65 TYPE 1 DIABETES MELLITUS WITH HYPERGLYCEMIA (HCC): ICD-10-CM

## 2021-12-22 DIAGNOSIS — E78.5 HYPERLIPIDEMIA, UNSPECIFIED HYPERLIPIDEMIA TYPE: ICD-10-CM

## 2021-12-22 LAB
ALBUMIN SERPL-MCNC: 4.1 G/DL (ref 3.5–5.2)
ALBUMIN UR-MCNC: 1.9 MG/DL
ALBUMIN/GLOB SERPL: 1.6 G/DL
ALP SERPL-CCNC: 68 U/L (ref 39–117)
ALT SERPL W P-5'-P-CCNC: 24 U/L (ref 1–33)
ANION GAP SERPL CALCULATED.3IONS-SCNC: 9.2 MMOL/L (ref 5–15)
AST SERPL-CCNC: 17 U/L (ref 1–32)
BILIRUB SERPL-MCNC: 0.3 MG/DL (ref 0–1.2)
BUN SERPL-MCNC: 8 MG/DL (ref 6–20)
BUN/CREAT SERPL: 9.5 (ref 7–25)
CALCIUM SPEC-SCNC: 9.1 MG/DL (ref 8.6–10.5)
CHLORIDE SERPL-SCNC: 99 MMOL/L (ref 98–107)
CHOLEST SERPL-MCNC: 146 MG/DL (ref 0–200)
CO2 SERPL-SCNC: 29.8 MMOL/L (ref 22–29)
CREAT SERPL-MCNC: 0.84 MG/DL (ref 0.57–1)
CREAT UR-MCNC: 120.1 MG/DL
GFR SERPL CREATININE-BSD FRML MDRD: 72 ML/MIN/1.73
GLOBULIN UR ELPH-MCNC: 2.5 GM/DL
GLUCOSE SERPL-MCNC: 201 MG/DL (ref 65–99)
HBA1C MFR BLD: 7.5 % (ref 3.5–5.6)
HDLC SERPL-MCNC: 39 MG/DL (ref 40–60)
LDLC SERPL CALC-MCNC: 81 MG/DL (ref 0–100)
LDLC/HDLC SERPL: 1.99 {RATIO}
MICROALBUMIN/CREAT UR: 15.8 MG/G
POTASSIUM SERPL-SCNC: 3.9 MMOL/L (ref 3.5–5.2)
PROT SERPL-MCNC: 6.6 G/DL (ref 6–8.5)
SODIUM SERPL-SCNC: 138 MMOL/L (ref 136–145)
TRIGL SERPL-MCNC: 146 MG/DL (ref 0–150)
TSH SERPL DL<=0.05 MIU/L-ACNC: 0.88 UIU/ML (ref 0.27–4.2)
VLDLC SERPL-MCNC: 26 MG/DL (ref 5–40)

## 2021-12-22 PROCEDURE — 82043 UR ALBUMIN QUANTITATIVE: CPT

## 2021-12-22 PROCEDURE — 83036 HEMOGLOBIN GLYCOSYLATED A1C: CPT

## 2021-12-22 PROCEDURE — 80053 COMPREHEN METABOLIC PANEL: CPT

## 2021-12-22 PROCEDURE — 80061 LIPID PANEL: CPT

## 2021-12-22 PROCEDURE — 84443 ASSAY THYROID STIM HORMONE: CPT

## 2021-12-22 PROCEDURE — 36415 COLL VENOUS BLD VENIPUNCTURE: CPT

## 2021-12-22 PROCEDURE — 82570 ASSAY OF URINE CREATININE: CPT

## 2021-12-29 ENCOUNTER — OFFICE VISIT (OUTPATIENT)
Dept: ENDOCRINOLOGY | Facility: CLINIC | Age: 51
End: 2021-12-29

## 2021-12-29 VITALS
TEMPERATURE: 96.9 F | DIASTOLIC BLOOD PRESSURE: 90 MMHG | HEIGHT: 66 IN | SYSTOLIC BLOOD PRESSURE: 140 MMHG | WEIGHT: 190.8 LBS | BODY MASS INDEX: 30.67 KG/M2 | HEART RATE: 96 BPM

## 2021-12-29 DIAGNOSIS — Z96.41 INSULIN PUMP STATUS: ICD-10-CM

## 2021-12-29 DIAGNOSIS — E10.65 TYPE 1 DIABETES MELLITUS WITH HYPERGLYCEMIA: Primary | ICD-10-CM

## 2021-12-29 DIAGNOSIS — E78.5 HYPERLIPIDEMIA, UNSPECIFIED HYPERLIPIDEMIA TYPE: ICD-10-CM

## 2021-12-29 DIAGNOSIS — E55.9 VITAMIN D DEFICIENCY: ICD-10-CM

## 2021-12-29 LAB — GLUCOSE BLDC GLUCOMTR-MCNC: 124 MG/DL (ref 70–105)

## 2021-12-29 PROCEDURE — 82962 GLUCOSE BLOOD TEST: CPT | Performed by: INTERNAL MEDICINE

## 2021-12-29 PROCEDURE — 99214 OFFICE O/P EST MOD 30 MIN: CPT | Performed by: INTERNAL MEDICINE

## 2021-12-29 RX ORDER — CALCIPOTRIENE AND BETAMETHASONE DIPROPIONATE 50; .5 UG/G; MG/G
SUSPENSION TOPICAL
COMMUNITY
Start: 2021-12-14

## 2021-12-29 RX ORDER — GLUCAGON 3 MG/1
3 POWDER NASAL AS NEEDED
Qty: 1 EACH | Refills: 1 | Status: SHIPPED | OUTPATIENT
Start: 2021-12-29

## 2022-01-08 NOTE — PROGRESS NOTES
[facility]        Patient Care Team:  Pallares, Clara Ann, MD as PCP - General    Chief Complaint:    Chief Complaint   Patient presents with   • Diabetes     follow up, Type 1,  2.5hr PP, Basal 12am 1.4, 3am 1.9, 6am 1.1, 9am 1.9, 12pm 1.7, 7 pm 1.5         Subjective   Here for diabetes f/u  Blood sugars: higher after eating  Insulin delivery per pump: Basal 61% / bolus 39%  Exercise program: walking @ work  Taking vit D    Interval History:     Patient Complaints: none  Patient Denies:  hypoglycemia  History taken from: patient    Review of Systems:   Review of Systems   Eyes: Negative for blurred vision.   Gastrointestinal: Negative for nausea.   Endocrine: Negative for polyuria.   Neurological: Negative for headache.     Lost  12 lb since last visit    Objective     Vital Signs      Vitals:    12/29/21 1522   BP: 140/90   Pulse: 96   Temp: 96.9 °F (36.1 °C)         Physical Exam:     General Appearance:    Alert, cooperative, in no acute distress   Head:    Normocephalic, without obvious abnormality, atraumatic   Eyes:            Lids and lashes normal, conjunctivae and sclerae normal, no   icterus, no pallor, corneas clear, PERRLA   Throat:   No oral lesions,  oral mucosa moist   Neck:   No adenopathy, supple,  no thyromegaly, no   carotid bruit   Lungs:     Clear     Heart:    Regular rhythm and normal rate   Chest Wall:    No abnormalities observed   Abdomen:     Normal bowel sounds, soft                 Extremities:   Moves all extremities well, no edema               Pulses:   Pulses palpable and equal bilaterally   Skin:   Pump site clean   Neurologic:  DTR 1+ in ankles, normal vibratory sense, able to feel the 10g monofilament          Results Review:    I have reviewed the patient's new clinical results, labs & imaging.    Medication Review:   Prior to Admission medications    Medication Sig Start Date End Date Taking? Authorizing Provider   atorvastatin (LIPITOR) 10 MG  "tablet TAKE ONE TABLET BY MOUTH DAILY 7/13/20   Bernarda eHnry MD   Contour Next Test test strip CHECK BLOOD SUGAR FOUR TIMES A DAY BEFORE MEALS AND AT BEDTIME 12/21/20   Bernarda Henry MD   fexofenadine (ALLEGRA) 180 MG tablet Take 180 mg by mouth Daily.    Giselle Mcbride MD   Glucagon (Baqsimi One Pack) 3 MG/DOSE powder 3 mg into the nostril(s) as directed by provider As Needed (for severe hypoglycemic episode). 12/29/21   Bernarda Henry MD   Insulin Syringe-Needle U-100 (B-D INS SYR ULTRAFINE 1CC/31G) 31G X 5/16\" 1 ML misc BD INSULIN SYR ULTRAFINE II 31G X 5/16\" 1 ML 6/21/16   Giselle Mcbride MD   lisinopril (PRINIVIL,ZESTRIL) 10 MG tablet 10 mg Daily. 8/8/19   Giselle Mcbride MD   montelukast (SINGULAIR) 10 MG tablet Take 10 mg by mouth Every Night.    Giselle Mcbride MD   NovoLOG 100 UNIT/ML injection USE AS DIRECTED IN INSULIN PUMP; MAXIMUM DAILY DOSE 100 UNITS 9/24/21   Bernarda Henry MD   Nutritional Supplements (VITAMIN D BOOSTER PO) Take  by mouth.    Giselle Mcbride MD   Taclonex 0.005-0.064 % external suspension  12/14/21   Giselle Mcbride MD       Lab Results (most recent)     Procedure Component Value Units Date/Time    POC Glucose [[id number]]  (Abnormal) Collected: 12/29/21 1526    Specimen: Blood Updated: 12/29/21 1527     Glucose 124 mg/dL      Comment: Serial Number: [id number]:  [id number]           Lab Results   Component Value Date    HGBA1C 7.5 (H) 12/22/2021    HGBA1C 7.6 (H) 06/14/2021    HGBA1C 7.5 (H) 12/15/2020      Lab Results   Component Value Date    GLUCOSE 201 (H) 12/22/2021    BUN 8 12/22/2021    CREATININE 0.84 12/22/2021    EGFRIFNONA 72 12/22/2021    BCR 9.5 12/22/2021    K 3.9 12/22/2021    CO2 29.8 (H) 12/22/2021    CALCIUM 9.1 12/22/2021    ALBUMIN 4.10 12/22/2021    LABIL2 1.3 09/25/2018    AST 17 12/22/2021    ALT 24 12/22/2021    CHOL 146 12/22/2021    LDL 81 12/22/2021    HDL 39 (L) 12/22/2021    TRIG 146 12/22/2021 "     Lab Results   Component Value Date    TSH 0.879 12/22/2021    [id number] 36.5 06/14/2021     Microalb/cr ratio 15.8    Assessment/Plan     Diagnoses and all orders for this visit:    1. Type 1 diabetes mellitus with hyperglycemia (HCC) (Primary) - improved  -     Hemoglobin A1c; Future  -     Glucagon (Baqsimi One Pack) 3 MG/DOSE powder; 3 mg into the nostril(s) as directed by provider As Needed (for severe hypoglycemic episode).  Dispense: 1 each; Refill: 1    2. Vitamin D deficiency - will check status next visit  -     Vitamin D 25 Hydroxy; Future    3. Hyperlipidemia, unspecified hyperlipidemia type - stable  -     Comprehensive Metabolic Panel; Future    4. Insulin pump status    Other orders  -     POC Glucose    Wearing a 670G MiniMed insulin pump with sensors since June 19th, 2020.    Continue diet & exercise.  Bolus before eating.  Continue atorvastatin & vit D supplements.  Decrease basal rate to 1.7 units/h from 9a-12p & to 1.4 units/h from 7p-12a.  Call if blood sugars are running under 100 or over 200.        Bernarda Henry MD  01/08/22  17:59 EST

## 2022-01-12 DIAGNOSIS — E10.65 TYPE 1 DIABETES MELLITUS WITH HYPERGLYCEMIA: ICD-10-CM

## 2022-01-13 RX ORDER — PERPHENAZINE 16 MG/1
TABLET, FILM COATED ORAL
Qty: 150 EACH | Refills: 5 | Status: SHIPPED | OUTPATIENT
Start: 2022-01-13

## 2022-02-22 ENCOUNTER — TELEPHONE (OUTPATIENT)
Dept: ENDOCRINOLOGY | Facility: CLINIC | Age: 52
End: 2022-02-22

## 2022-04-11 ENCOUNTER — TELEPHONE (OUTPATIENT)
Dept: ENDOCRINOLOGY | Facility: CLINIC | Age: 52
End: 2022-04-11

## 2022-04-11 DIAGNOSIS — E10.65 TYPE 1 DIABETES MELLITUS WITH HYPERGLYCEMIA: Primary | ICD-10-CM

## 2022-04-11 RX ORDER — PROCHLORPERAZINE 25 MG/1
SUPPOSITORY RECTAL
Qty: 3 EACH | Refills: 3 | Status: SHIPPED | OUTPATIENT
Start: 2022-04-11 | End: 2022-08-15

## 2022-04-11 RX ORDER — PROCHLORPERAZINE 25 MG/1
SUPPOSITORY RECTAL
COMMUNITY
End: 2022-04-11 | Stop reason: SDUPTHER

## 2022-04-11 RX ORDER — PROCHLORPERAZINE 25 MG/1
1 SUPPOSITORY RECTAL CONTINUOUS
Qty: 1 EACH | Refills: 3 | Status: SHIPPED | OUTPATIENT
Start: 2022-04-11 | End: 2023-04-05

## 2022-04-11 NOTE — TELEPHONE ENCOUNTER
Pt called and c/o being sick with elevated BG's.  [facility]link reports scanned into phone note.  Pt was recently put on an antibiotic and Flonase.  Talked pt over phone in getting back into [employer].  Pt had to check a BG before as instructed by pump.  Pt also c/o problems with current 9 mm QuickSets and [place] sensors.  She states the cannulas come out frequently bent or blood in them. Pt to call [employer] about trying the 6 mm Quicksets and she will try to insert sensors on back of arm or front of thigh. Caryn c/o running out of sensors early and/or pump not accepting BG requested and kicks her out of [employer]. Recommended if pt is out of [employer]ode, sick & with elevated BG's she can use a temp basal of +10-20%. Pt asked for Dexcom rx sent to pharmacy to try. Pt to f/u in a few days and let us know how she is doing

## 2022-04-29 ENCOUNTER — TELEPHONE (OUTPATIENT)
Dept: ENDOCRINOLOGY | Facility: CLINIC | Age: 52
End: 2022-04-29

## 2022-07-30 ENCOUNTER — LAB (OUTPATIENT)
Dept: LAB | Facility: HOSPITAL | Age: 52
End: 2022-07-30

## 2022-07-30 DIAGNOSIS — E55.9 VITAMIN D DEFICIENCY: ICD-10-CM

## 2022-07-30 DIAGNOSIS — E10.65 TYPE 1 DIABETES MELLITUS WITH HYPERGLYCEMIA: ICD-10-CM

## 2022-07-30 DIAGNOSIS — E78.5 HYPERLIPIDEMIA, UNSPECIFIED HYPERLIPIDEMIA TYPE: ICD-10-CM

## 2022-07-30 LAB
25(OH)D3 SERPL-MCNC: 44.6 NG/ML (ref 30–100)
ALBUMIN SERPL-MCNC: 4.7 G/DL (ref 3.5–5.2)
ALBUMIN/GLOB SERPL: 2 G/DL
ALP SERPL-CCNC: 78 U/L (ref 39–117)
ALT SERPL W P-5'-P-CCNC: 25 U/L (ref 1–33)
ANION GAP SERPL CALCULATED.3IONS-SCNC: 7.8 MMOL/L (ref 5–15)
AST SERPL-CCNC: 20 U/L (ref 1–32)
BILIRUB SERPL-MCNC: 0.5 MG/DL (ref 0–1.2)
BUN SERPL-MCNC: 9 MG/DL (ref 6–20)
BUN/CREAT SERPL: 10 (ref 7–25)
CALCIUM SPEC-SCNC: 9.5 MG/DL (ref 8.6–10.5)
CHLORIDE SERPL-SCNC: 103 MMOL/L (ref 98–107)
CO2 SERPL-SCNC: 30.2 MMOL/L (ref 22–29)
CREAT SERPL-MCNC: 0.9 MG/DL (ref 0.57–1)
EGFRCR SERPLBLD CKD-EPI 2021: 78.5 ML/MIN/1.73
GLOBULIN UR ELPH-MCNC: 2.3 GM/DL
GLUCOSE SERPL-MCNC: 202 MG/DL (ref 65–99)
POTASSIUM SERPL-SCNC: 4.4 MMOL/L (ref 3.5–5.2)
PROT SERPL-MCNC: 7 G/DL (ref 6–8.5)
SODIUM SERPL-SCNC: 141 MMOL/L (ref 136–145)

## 2022-07-30 PROCEDURE — 36415 COLL VENOUS BLD VENIPUNCTURE: CPT

## 2022-07-30 PROCEDURE — 83036 HEMOGLOBIN GLYCOSYLATED A1C: CPT

## 2022-07-30 PROCEDURE — 80053 COMPREHEN METABOLIC PANEL: CPT

## 2022-07-30 PROCEDURE — 82306 VITAMIN D 25 HYDROXY: CPT

## 2022-08-01 LAB — HBA1C MFR BLD: 8 % (ref 3.5–5.6)

## 2022-08-09 ENCOUNTER — OFFICE VISIT (OUTPATIENT)
Dept: ENDOCRINOLOGY | Facility: CLINIC | Age: 52
End: 2022-08-09

## 2022-08-09 VITALS
HEIGHT: 66 IN | DIASTOLIC BLOOD PRESSURE: 70 MMHG | OXYGEN SATURATION: 97 % | HEART RATE: 106 BPM | WEIGHT: 188 LBS | BODY MASS INDEX: 30.22 KG/M2 | SYSTOLIC BLOOD PRESSURE: 110 MMHG

## 2022-08-09 DIAGNOSIS — E55.9 VITAMIN D DEFICIENCY: ICD-10-CM

## 2022-08-09 DIAGNOSIS — E10.65 TYPE 1 DIABETES MELLITUS WITH HYPERGLYCEMIA: Primary | ICD-10-CM

## 2022-08-09 DIAGNOSIS — E78.5 HYPERLIPIDEMIA, UNSPECIFIED HYPERLIPIDEMIA TYPE: ICD-10-CM

## 2022-08-09 DIAGNOSIS — Z96.41 INSULIN PUMP STATUS: ICD-10-CM

## 2022-08-09 PROCEDURE — 99214 OFFICE O/P EST MOD 30 MIN: CPT | Performed by: INTERNAL MEDICINE

## 2022-08-09 RX ORDER — BLOOD SUGAR DIAGNOSTIC
STRIP MISCELLANEOUS
Qty: 100 EACH | Refills: 3 | Status: SHIPPED | OUTPATIENT
Start: 2022-08-09

## 2022-08-09 NOTE — PATIENT INSTRUCTIONS
Continue exercise.  Continue same pump settings, chol meds & vit D supplements.  Ok to use sensor in arm / leg.  Call if blood sugars are running under 100 or over 200.  F/u in 6 months, with fasting labs prior.

## 2022-08-13 DIAGNOSIS — E10.65 TYPE 1 DIABETES MELLITUS WITH HYPERGLYCEMIA: ICD-10-CM

## 2022-08-15 RX ORDER — PROCHLORPERAZINE 25 MG/1
SUPPOSITORY RECTAL
Qty: 3 EACH | Refills: 3 | Status: SHIPPED | OUTPATIENT
Start: 2022-08-15 | End: 2022-12-12

## 2022-08-17 ENCOUNTER — TELEPHONE (OUTPATIENT)
Dept: ENDOCRINOLOGY | Facility: CLINIC | Age: 52
End: 2022-08-17

## 2022-08-19 NOTE — PROGRESS NOTES
[facility]        Patient Care Team:  Pallares, Clara Ann, MD as PCP - General    Chief Complaint:    Chief Complaint   Patient presents with   • Diabetes     Type 1 /  / Ate 8:00 pm / Pump 12a-3a 1.4, 3a-6a 1.9, 6a-9a 1.1, 9a-7p 1.7, 7p-12a 1.4.         Subjective   Here for diabetes f/u  Blood sugars: higher after eating  Changed to DexCom sensors, but still having issue w bleeding  Exercise program: walking @ work  Taking vit D    Interval History:     Patient Complaints: had COVID infection in May  Patient Denies: hypoglycemia  History taken from: patient    Review of Systems:   Review of Systems   Eyes: Negative for blurred vision.   Gastrointestinal: Negative for nausea.   Endocrine: Negative for polyuria.   Neurological: Negative for headache.     Lost  2 lb since last visit    Objective     Vital Signs      Vitals:    08/09/22 0818   BP: 110/70   Pulse: 106   SpO2: 97%         Physical Exam:     General Appearance:    Alert, cooperative, in no acute distress   Head:    Normocephalic, without obvious abnormality, atraumatic   Eyes:            Lids and lashes normal, conjunctivae and sclerae normal, no   icterus, no pallor, corneas clear, PERRLA   Throat:   No oral lesions,  oral mucosa moist   Neck:   No adenopathy, supple,  no thyromegaly, no   carotid bruit   Lungs:     Clear     Heart:    Regular rhythm and normal rate   Chest Wall:    No abnormalities observed   Abdomen:     Normal bowel sounds, soft                 Extremities:   Moves all extremities well, no edema               Pulses:   Pulses palpable and equal bilaterally   Skin:   Pump site clean   Neurologic:  DTR 1+, able to feel the 10g monofilament          Results Review:    I have reviewed the patient's new clinical results, labs & imaging.    Medication Review:   Prior to Admission medications    Medication Sig Start Date End Date Taking? Authorizing Provider   atorvastatin (LIPITOR) 10 MG tablet TAKE ONE TABLET  "BY MOUTH DAILY 7/13/20  Yes Bernarda Henry MD   Continuous Blood Gluc Transmit (Dexcom G6 Transmitter) misc 1 each Continuous. Pt to use to continuously monitor her blood sugars.  Pt to replace every 90 days. 4/11/22  Yes Bernarda Henry MD   Contour Next Test test strip CHECK BLOOD SUGAR FOUR TIMES A DAY BEFORE MEALS AND AT BEDTIME 1/13/22  Yes Bernarda Henry MD   fexofenadine (ALLEGRA) 180 MG tablet Take 180 mg by mouth Daily.   Yes Giselle Mcbride MD   Glucagon (Baqsimi One Pack) 3 MG/DOSE powder 3 mg into the nostril(s) as directed by provider As Needed (for severe hypoglycemic episode). 12/29/21  Yes Bernarda Henry MD   lisinopril (PRINIVIL,ZESTRIL) 10 MG tablet 10 mg Daily. 8/8/19  Yes Giselle Mcbride MD   montelukast (SINGULAIR) 10 MG tablet Take 10 mg by mouth Every Night.   Yes Giselle Mcbride MD   NovoLOG 100 UNIT/ML injection USE AS DIRECTED IN INSULIN PUMP; MAXIMUM DAILY DOSE 100 UNITS 9/24/21  Yes Bernarda Henry MD   Nutritional Supplements (VITAMIN D BOOSTER PO) Take  by mouth.   Yes Giselle Mcbride MD   Taclonex 0.005-0.064 % external suspension  12/14/21  Yes Giselle Mcbride MD   Continuous Blood Gluc Sensor (Dexcom G6 Sensor) APPLY AND CHANGE EVERY 10 DAYS; USE CONTINUOUSLY TO MONITOR BLOOD SUGARS 8/15/22   Bernarda Henry MD   Insulin Syringe-Needle U-100 (GNP Insulin Syringes 31Gx5/16\") 31G X 5/16\" 0.3 ML misc For insulin injections daily. 8/9/22   Bernarda Henry MD       Lab Results (most recent)     None        Lab Results   Component Value Date    HGBA1C 8.0 (H) 07/30/2022    HGBA1C 7.5 (H) 12/22/2021    HGBA1C 7.6 (H) 06/14/2021      Lab Results   Component Value Date    GLUCOSE 202 (H) 07/30/2022    BUN 9 07/30/2022    CREATININE 0.90 07/30/2022    EGFRIFNONA 72 12/22/2021    BCR 10.0 07/30/2022    K 4.4 07/30/2022    CO2 30.2 (H) 07/30/2022    CALCIUM 9.5 07/30/2022    ALBUMIN 4.70 07/30/2022    LABIL2 1.3 09/25/2018    AST 20 " "07/30/2022    ALT 25 07/30/2022    CHOL 146 12/22/2021    LDL 81 12/22/2021    HDL 39 (L) 12/22/2021    TRIG 146 12/22/2021     Lab Results   Component Value Date    TSH 0.879 12/22/2021    UAGZ75KP 44.6 07/30/2022       Assessment & Plan     Diagnoses and all orders for this visit:    1. Type 1 diabetes mellitus with hyperglycemia (HCC) (Primary) - worse  -     Hemoglobin A1c; Future  -     Microalbumin / Creatinine Urine Ratio - Urine, Clean Catch; Future  -     Insulin Syringe-Needle U-100 (GNP Insulin Syringes 31Gx5/16\") 31G X 5/16\" 0.3 ML misc; For insulin injections daily.  Dispense: 100 each; Refill: 3    2. Vitamin D deficiency - stable    3. Hyperlipidemia, unspecified hyperlipidemia type - will check status next visit  -     Comprehensive Metabolic Panel; Future  -     Lipid Panel; Future  -     TSH; Future    4. Insulin pump status    Wearing a 670G MiniMed insulin pump since June 19th, 2020. Switched to DexCom CGMS in April 2022.    Continue exercise.  Continue same pump settings, chol meds & vit D supplements.  Ok to use sensor in arm / leg.  Call if blood sugars are running under 100 or over 200.        Bernarda Henry MD  08/19/22  19:43 EDT        "

## 2022-08-23 ENCOUNTER — TELEPHONE (OUTPATIENT)
Dept: ENDOCRINOLOGY | Facility: CLINIC | Age: 52
End: 2022-08-23

## 2022-08-23 NOTE — TELEPHONE ENCOUNTER
Pt is planning to try using her arms or legs as dexcom sensor sites. She is going to try pinching up the skin.

## 2022-10-10 RX ORDER — INSULIN ASPART 100 [IU]/ML
INJECTION, SOLUTION INTRAVENOUS; SUBCUTANEOUS
Qty: 30 ML | Refills: 6 | Status: SHIPPED | OUTPATIENT
Start: 2022-10-10 | End: 2023-02-15 | Stop reason: SDUPTHER

## 2022-12-10 DIAGNOSIS — E10.65 TYPE 1 DIABETES MELLITUS WITH HYPERGLYCEMIA: ICD-10-CM

## 2022-12-12 RX ORDER — PROCHLORPERAZINE 25 MG/1
SUPPOSITORY RECTAL
Qty: 3 EACH | Refills: 3 | Status: SHIPPED | OUTPATIENT
Start: 2022-12-12 | End: 2023-03-09

## 2023-02-10 ENCOUNTER — LAB (OUTPATIENT)
Dept: LAB | Facility: HOSPITAL | Age: 53
End: 2023-02-10
Payer: COMMERCIAL

## 2023-02-10 DIAGNOSIS — E78.5 HYPERLIPIDEMIA, UNSPECIFIED HYPERLIPIDEMIA TYPE: ICD-10-CM

## 2023-02-10 DIAGNOSIS — E10.65 TYPE 1 DIABETES MELLITUS WITH HYPERGLYCEMIA: ICD-10-CM

## 2023-02-10 LAB
ALBUMIN SERPL-MCNC: 4.6 G/DL (ref 3.5–5.2)
ALBUMIN UR-MCNC: <1.2 MG/DL
ALBUMIN/GLOB SERPL: 1.7 G/DL
ALP SERPL-CCNC: 77 U/L (ref 39–117)
ALT SERPL W P-5'-P-CCNC: 17 U/L (ref 1–33)
ANION GAP SERPL CALCULATED.3IONS-SCNC: 10 MMOL/L (ref 5–15)
AST SERPL-CCNC: 16 U/L (ref 1–32)
BILIRUB SERPL-MCNC: 0.6 MG/DL (ref 0–1.2)
BUN SERPL-MCNC: 10 MG/DL (ref 6–20)
BUN/CREAT SERPL: 11.2 (ref 7–25)
CALCIUM SPEC-SCNC: 9.6 MG/DL (ref 8.6–10.5)
CHLORIDE SERPL-SCNC: 97 MMOL/L (ref 98–107)
CHOLEST SERPL-MCNC: 147 MG/DL (ref 0–200)
CO2 SERPL-SCNC: 31 MMOL/L (ref 22–29)
CREAT SERPL-MCNC: 0.89 MG/DL (ref 0.57–1)
CREAT UR-MCNC: 95.9 MG/DL
EGFRCR SERPLBLD CKD-EPI 2021: 79.1 ML/MIN/1.73
GLOBULIN UR ELPH-MCNC: 2.7 GM/DL
GLUCOSE SERPL-MCNC: 269 MG/DL (ref 65–99)
HBA1C MFR BLD: 8.3 % (ref 3.5–5.6)
HDLC SERPL-MCNC: 53 MG/DL (ref 40–60)
LDLC SERPL CALC-MCNC: 81 MG/DL (ref 0–100)
LDLC/HDLC SERPL: 1.54 {RATIO}
MICROALBUMIN/CREAT UR: NORMAL MG/G{CREAT}
POTASSIUM SERPL-SCNC: 4.7 MMOL/L (ref 3.5–5.2)
PROT SERPL-MCNC: 7.3 G/DL (ref 6–8.5)
SODIUM SERPL-SCNC: 138 MMOL/L (ref 136–145)
TRIGL SERPL-MCNC: 62 MG/DL (ref 0–150)
TSH SERPL DL<=0.05 MIU/L-ACNC: 0.46 UIU/ML (ref 0.27–4.2)
VLDLC SERPL-MCNC: 13 MG/DL (ref 5–40)

## 2023-02-10 PROCEDURE — 82043 UR ALBUMIN QUANTITATIVE: CPT

## 2023-02-10 PROCEDURE — 84443 ASSAY THYROID STIM HORMONE: CPT

## 2023-02-10 PROCEDURE — 83036 HEMOGLOBIN GLYCOSYLATED A1C: CPT

## 2023-02-10 PROCEDURE — 82570 ASSAY OF URINE CREATININE: CPT

## 2023-02-10 PROCEDURE — 80061 LIPID PANEL: CPT

## 2023-02-10 PROCEDURE — 80053 COMPREHEN METABOLIC PANEL: CPT

## 2023-02-10 PROCEDURE — 36415 COLL VENOUS BLD VENIPUNCTURE: CPT

## 2023-02-10 RX ORDER — DEXTROMETHORPHAN HYDROBROMIDE AND PROMETHAZINE HYDROCHLORIDE 15; 6.25 MG/5ML; MG/5ML
SYRUP ORAL
COMMUNITY
Start: 2022-12-24 | End: 2023-02-15

## 2023-02-10 RX ORDER — MELOXICAM 15 MG/1
TABLET ORAL
COMMUNITY
Start: 2022-12-08 | End: 2023-02-15

## 2023-02-15 ENCOUNTER — OFFICE VISIT (OUTPATIENT)
Dept: ENDOCRINOLOGY | Facility: CLINIC | Age: 53
End: 2023-02-15
Payer: COMMERCIAL

## 2023-02-15 VITALS
HEART RATE: 111 BPM | BODY MASS INDEX: 31.34 KG/M2 | WEIGHT: 195 LBS | SYSTOLIC BLOOD PRESSURE: 120 MMHG | HEIGHT: 66 IN | DIASTOLIC BLOOD PRESSURE: 80 MMHG

## 2023-02-15 DIAGNOSIS — Z96.41 INSULIN PUMP STATUS: ICD-10-CM

## 2023-02-15 DIAGNOSIS — E55.9 VITAMIN D DEFICIENCY: ICD-10-CM

## 2023-02-15 DIAGNOSIS — E78.5 HYPERLIPIDEMIA, UNSPECIFIED HYPERLIPIDEMIA TYPE: ICD-10-CM

## 2023-02-15 DIAGNOSIS — E10.65 TYPE 1 DIABETES MELLITUS WITH HYPERGLYCEMIA: Primary | ICD-10-CM

## 2023-02-15 PROCEDURE — 99214 OFFICE O/P EST MOD 30 MIN: CPT | Performed by: INTERNAL MEDICINE

## 2023-03-06 NOTE — PROGRESS NOTES
[facility]        Patient Care Team:  Pallares, Clara Ann, MD as PCP - General    Chief Complaint:    Chief Complaint   Patient presents with   • Diabetes     Type 1, , 3.5 hr PP,  Basal 12am 1.40, 3am 1.90, 6am 1.10, 9am 1.70, 7pm 1.40         Subjective   Here for diabetes f/u  Blood sugars: in the high 100's  Insulin delivery per pump: Basal 49% / bolus 51%  Exercise program: less due to plantar fasciitis  Off vit D due to bleeding from pump site    Interval History:     Patient Complaints: had steroid shots to feet in Sept  Patient Denies: hypoglycemia  History taken from: patient    Review of Systems:   Review of Systems   Constitutional: Positive for unexpected weight gain.   HENT: Negative for trouble swallowing.    Eyes: Negative for blurred vision.   Gastrointestinal: Negative for nausea.   Endocrine: Negative for polyuria.   Musculoskeletal:        Feet pain   Neurological: Negative for headache.     Gained 7 lb since last visit    Objective     Vital Signs      Vitals:    02/15/23 1451   BP: 120/80   Pulse: 111         Physical Exam:     General Appearance:    Alert, cooperative, in no acute distress   Head:    Normocephalic, without obvious abnormality, atraumatic   Eyes:            Lids and lashes normal, conjunctivae and sclerae normal, no   icterus, no pallor, corneas clear, PERRLA   Throat:   No oral lesions,  oral mucosa moist   Neck:   No adenopathy, supple,  no thyromegaly, no   carotid bruit   Lungs:     Clear    Heart:    Regular rhythm and normal rate   Chest Wall:    No abnormalities observed   Abdomen:     Normal bowel sounds, soft                 Extremities:   Moves all extremities well, no edema               Pulses:   Pulses palpable and equal bilaterally   Skin:   Pump site clean   Neurologic:  DTR 1+, normal vibratory sense, able to feel the 10g monofilament          Results Review:    I have reviewed the patient's new clinical results, labs &  "imaging.    Medication Review:   Prior to Admission medications    Medication Sig Start Date End Date Taking? Authorizing Provider   atorvastatin (LIPITOR) 10 MG tablet TAKE ONE TABLET BY MOUTH DAILY 7/13/20  Yes Bernarda Henry MD   Continuous Blood Gluc Sensor (Dexcom G6 Sensor) APPLY AND CHANGE EVERY 10 DAYS; USE CONTINUOUSLY TO MONITOR BLOOD SUGARS 12/12/22  Yes Bernarda Henry MD   Continuous Blood Gluc Transmit (Dexcom G6 Transmitter) misc 1 each Continuous. Pt to use to continuously monitor her blood sugars.  Pt to replace every 90 days. 4/11/22  Yes Bernarda Henry MD   Contour Next Test test strip CHECK BLOOD SUGAR FOUR TIMES A DAY BEFORE MEALS AND AT BEDTIME 1/13/22  Yes Bernarda Henry MD   fexofenadine (ALLEGRA) 180 MG tablet Take 180 mg by mouth Daily.   Yes Giselle Mcbride MD   Glucagon (Baqsimi One Pack) 3 MG/DOSE powder 3 mg into the nostril(s) as directed by provider As Needed (for severe hypoglycemic episode). 12/29/21  Yes Bernarda Henry MD   Insulin Syringe-Needle U-100 (GNP Insulin Syringes 31Gx5/16\") 31G X 5/16\" 0.3 ML misc For insulin injections daily. 8/9/22  Yes Bernarda Henry MD   lisinopril (PRINIVIL,ZESTRIL) 10 MG tablet 10 mg Daily. 8/8/19  Yes Giselle Mcbride MD   montelukast (SINGULAIR) 10 MG tablet Take 10 mg by mouth Every Night.   Yes Giselle Mcbride MD   NovoLOG 100 UNIT/ML injection USE AS DIRECTED IN INSULIN PUMP; MAXIMUM DAILY DOSE 100 UNITS 9/24/21  Yes Bernarda Henry MD   Nutritional Supplements (VITAMIN D BOOSTER PO) Take  by mouth.   Yes Giselle Mcbride MD   Taclonex 0.005-0.064 % external suspension  12/14/21  Yes Giselle Mcbride MD       Lab Results (most recent)     None        Lab Results   Component Value Date    HGBA1C 8.3 (H) 02/10/2023    HGBA1C 8.0 (H) 07/30/2022    HGBA1C 7.5 (H) 12/22/2021      Lab Results   Component Value Date    GLUCOSE 269 (H) 02/10/2023    BUN 10 02/10/2023    CREATININE 0.89 02/10/2023    " EGFRIFNONA 72 12/22/2021    BCR 11.2 02/10/2023    K 4.7 02/10/2023    CO2 31.0 (H) 02/10/2023    CALCIUM 9.6 02/10/2023    ALBUMIN 4.6 02/10/2023    LABIL2 1.3 09/25/2018    AST 16 02/10/2023    ALT 17 02/10/2023    CHOL 147 02/10/2023    LDL 81 02/10/2023    HDL 53 02/10/2023    TRIG 62 02/10/2023     Lab Results   Component Value Date    TSH 0.457 02/10/2023    WPMO47JQ 44.6 07/30/2022     Microalb/cr ratio <1    Assessment & Plan     Diagnoses and all orders for this visit:    1. Type 1 diabetes mellitus with hyperglycemia (HCC) (Primary) - worse  -     Hemoglobin A1c; Future    2. Vitamin D deficiency - will check status next visit  -     Vitamin D,25-Hydroxy; Future    3. Hyperlipidemia, unspecified hyperlipidemia type - stable    4. Insulin pump status    Wearing a 670G MiniMed insulin pump since June 19th, 2020. Switched to DexCom CGMS in April 2022.  Pt has agreed to wear the CGM device and share readings on a regular basis with our office.      See eye doctor in July as scheduled.  Increase exercise as planned.  Continue same pump settings & atorvastatin.  Call if blood sugars are running under 100 or over 200.        Bernarda Henry MD  03/05/23  23:07 EST

## 2023-03-09 DIAGNOSIS — E10.65 TYPE 1 DIABETES MELLITUS WITH HYPERGLYCEMIA: Primary | ICD-10-CM

## 2023-03-09 RX ORDER — BLOOD-GLUCOSE SENSOR
1 EACH MISCELLANEOUS
Qty: 9 EACH | Refills: 3 | Status: SHIPPED | OUTPATIENT
Start: 2023-03-09

## 2023-04-05 DIAGNOSIS — E10.65 TYPE 1 DIABETES MELLITUS WITH HYPERGLYCEMIA: ICD-10-CM

## 2023-04-05 RX ORDER — PROCHLORPERAZINE 25 MG/1
SUPPOSITORY RECTAL
Qty: 1 EACH | Refills: 3 | Status: SHIPPED | OUTPATIENT
Start: 2023-04-05

## 2023-04-10 ENCOUNTER — TELEPHONE (OUTPATIENT)
Dept: ENDOCRINOLOGY | Facility: CLINIC | Age: 53
End: 2023-04-10
Payer: COMMERCIAL

## 2023-05-12 RX ORDER — INSULIN ASPART 100 [IU]/ML
INJECTION, SOLUTION INTRAVENOUS; SUBCUTANEOUS
Qty: 30 ML | Refills: 3 | Status: SHIPPED | OUTPATIENT
Start: 2023-05-12

## 2023-09-09 ENCOUNTER — LAB (OUTPATIENT)
Dept: LAB | Facility: HOSPITAL | Age: 53
End: 2023-09-09
Payer: COMMERCIAL

## 2023-09-09 DIAGNOSIS — E10.65 TYPE 1 DIABETES MELLITUS WITH HYPERGLYCEMIA: ICD-10-CM

## 2023-09-09 DIAGNOSIS — E55.9 VITAMIN D DEFICIENCY: ICD-10-CM

## 2023-09-09 LAB
25(OH)D3 SERPL-MCNC: 32.6 NG/ML (ref 30–100)
HBA1C MFR BLD: 8.5 % (ref 4.8–5.6)

## 2023-09-09 PROCEDURE — 82306 VITAMIN D 25 HYDROXY: CPT

## 2023-09-09 PROCEDURE — 36415 COLL VENOUS BLD VENIPUNCTURE: CPT

## 2023-09-09 PROCEDURE — 83036 HEMOGLOBIN GLYCOSYLATED A1C: CPT

## 2023-09-18 ENCOUNTER — OFFICE VISIT (OUTPATIENT)
Dept: ENDOCRINOLOGY | Facility: CLINIC | Age: 53
End: 2023-09-18
Payer: COMMERCIAL

## 2023-09-18 VITALS
DIASTOLIC BLOOD PRESSURE: 80 MMHG | SYSTOLIC BLOOD PRESSURE: 122 MMHG | BODY MASS INDEX: 31.96 KG/M2 | WEIGHT: 198 LBS | OXYGEN SATURATION: 97 % | HEART RATE: 96 BPM

## 2023-09-18 DIAGNOSIS — E10.65 TYPE 1 DIABETES MELLITUS WITH HYPERGLYCEMIA: Primary | ICD-10-CM

## 2023-09-18 DIAGNOSIS — E55.9 VITAMIN D DEFICIENCY: ICD-10-CM

## 2023-09-18 DIAGNOSIS — E78.5 HYPERLIPIDEMIA, UNSPECIFIED HYPERLIPIDEMIA TYPE: ICD-10-CM

## 2023-09-18 DIAGNOSIS — Z96.41 INSULIN PUMP STATUS: ICD-10-CM

## 2023-09-18 PROCEDURE — 99214 OFFICE O/P EST MOD 30 MIN: CPT | Performed by: INTERNAL MEDICINE

## 2023-09-18 RX ORDER — GLUCAGON 3 MG/1
3 POWDER NASAL AS NEEDED
Qty: 1 EACH | Refills: 1 | Status: SHIPPED | OUTPATIENT
Start: 2023-09-18

## 2023-09-18 NOTE — PATIENT INSTRUCTIONS
Schedule pump preview after January, 2nd part.  Increase exercise as able.  Increase basal rate to 1.9 units/h from 9a-7p.  Continue atorvastatin.  Call if blood sugars are running under 100 or over 200.  F/u in 6 months, with fasting labs prior.

## 2023-09-27 ENCOUNTER — TELEPHONE (OUTPATIENT)
Dept: ENDOCRINOLOGY | Facility: CLINIC | Age: 53
End: 2023-09-27

## 2023-09-27 NOTE — TELEPHONE ENCOUNTER
Caller: Caryn Gallo    Relationship to patient: Self    Best call back number: [phone]    Patient is needing: PT HAS A MEDTRONIC DEVICE - MINI  G, AND SHE IS HAVING ISSUE WITH CANULA IT KEEPS BENDING, AND INSULIN FLOW IS BLOCKED, PT NEEDS TO SPEAK WITH MA OR DIABETIC EDUCATOR

## 2023-09-29 NOTE — TELEPHONE ENCOUNTER
Hub staff attempted to follow warm transfer process and was unsuccessful     Caller: Caryn Gallo    Relationship to patient: Self    Best call back number: [phone]     Patient is needing:  PATIENT CALLED BACK, STATING NO ONE HAS CALLED HER YET.  SHE IS STILL HAVING AN ISSUE WITH THE CANNULA ON HER INSULIN PUMP BENDING AND WOULD LIKE TO SPEAK TO SOMEONE CLINICAL ABOUT HOW TO RESOLVE THE ISSUE.  PLEASE CONTACT PATIENT TO ADVISE, THANK YOU.

## 2023-10-02 RX ORDER — INSULIN ASPART 100 [IU]/ML
INJECTION, SOLUTION INTRAVENOUS; SUBCUTANEOUS
Qty: 30 ML | Refills: 6 | Status: SHIPPED | OUTPATIENT
Start: 2023-10-02

## 2023-10-03 ENCOUNTER — TELEPHONE (OUTPATIENT)
Dept: ENDOCRINOLOGY | Facility: CLINIC | Age: 53
End: 2023-10-03
Payer: COMMERCIAL

## 2023-10-03 NOTE — TELEPHONE ENCOUNTER
Spoke with pt over the phone regarding her Bgs and pump. Pt states that she has COVID and has had elevated Bgs. Pt states they run between 200-250. Pt with Dexcom G7. Pt not sharing Dexcom with clinic. Pt has been having issues with her insulin pump. Pt states site is bleeding excessively and she used 12 different quicksets the last time she changed her site. Pt currently with pump in place and it is working. Educated pt on placement of pump site. Provided pt with [employer] rep number to obtain trials of different infusion sets.     Changed pump settings to help with elevated BGs per MD protocol:   12A-3A --> 1.55  3A-6A --> 2.15  6A-9A --> 1.25  9A-7P --> 2.15  7P-12A --> 1.60    Pt aware to call clinic with BGs that are <100 or above 200 or if insulin pump issues continue.       Lyndsay Gimenez RD, LD, [facility]   Nutrition and Diabetes Education     Diabetes Center   [facility]  [address] [place] [address]

## 2023-10-04 NOTE — PROGRESS NOTES
[facility]        Patient Care Team:  Pallares, Clara Ann, MD as PCP - General    Chief Complaint:    Chief Complaint   Patient presents with    Diabetes     FU / DM Type 1 /  Dexccom / Ate @ 12 / Insulin pump 12a-3am 1.40, 3a-6a 1.90, 6a-9a 1.10, 9a-7p 1.70, 7p-12a 1.40         Subjective   Here for diabetes f/u  Blood sugars: in the low 200's  Insulin delivery per pump: Basal 47%/ bolus 53%  Exercise program: none  Not taking vit D during summer    Interval History:     Patient Complaints:  traumatic concussion in Aug  Patient Denies: hypoglycemia  History taken from: patient    Review of Systems:   Review of Systems   Eyes:  Negative for blurred vision.   Gastrointestinal:  Negative for nausea.   Endocrine: Negative for polyuria.   Neurological:  Negative for headache.   Gained 3 lb since last visit    Objective     Vital Signs     Vitals:    09/18/23 1514   BP: 122/80   Pulse: 96   SpO2: 97%         Physical Exam:     General Appearance:    Alert, cooperative, in no acute distress   Head:    Normocephalic, without obvious abnormality, atraumatic   Eyes:            Lids and lashes normal, conjunctivae and sclerae normal, no   icterus, no pallor, corneas clear, PERRLA   Throat:   No oral lesions,  oral mucosa moist   Neck:   No adenopathy, supple,  no thyromegaly, no carotid bruit   Lungs:     Clear    Heart:    Regular rhythm and normal rate   Chest Wall:    No abnormalities observed   Abdomen:     Normal bowel sounds, soft                 Extremities:   Moves all extremities well, no edema               Pulses:   Pulses palpable and equal bilaterally   Skin:   Pump site clean   Neurologic:  DTR 1+, able to feel the 10g monofilament          Results Review:    I have reviewed the patient's new clinical results, labs & imaging.    Medication Review:   Prior to Admission medications    Medication Sig Start Date End Date Taking? Authorizing Provider   atorvastatin (LIPITOR) 10 MG tablet  "TAKE ONE TABLET BY MOUTH DAILY 7/13/20  Yes Bernarda Henry MD   Continuous Blood Gluc Sensor (Dexcom G7 Sensor) misc 1 each Every 10 (Ten) Days. 3/9/23  Yes Bernarda Henry MD   Continuous Blood Gluc Transmit (Dexcom G6 Transmitter) misc USE CONTINUOUSLY TO MONITOR BLOOD SUGARS; REPLACE EVERY 90 DAYS 4/5/23  Yes Bernarda Henry MD   Contour Next Test test strip CHECK BLOOD SUGAR FOUR TIMES A DAY BEFORE MEALS AND AT BEDTIME 1/13/22  Yes Bernarda Henry MD   fexofenadine (ALLEGRA) 180 MG tablet Take 1 tablet by mouth Daily.   Yes ProviderGiselle MD   Glucagon (Baqsimi One Pack) 3 MG/DOSE powder 3 mg into the nostril(s) as directed by provider As Needed (for severe hypoglycemic episode). 9/18/23  Yes Bernarda Henry MD   Insulin Syringe-Needle U-100 (GNP Insulin Syringes 31Gx5/16\") 31G X 5/16\" 0.3 ML misc For insulin injections daily. 8/9/22  Yes Bernarda Henry MD   lisinopril (PRINIVIL,ZESTRIL) 10 MG tablet 1 tablet Daily. 8/8/19  Yes Giselle Mcbride MD   montelukast (SINGULAIR) 10 MG tablet Take 1 tablet by mouth Every Night.   Yes Giselle Mcbride MD   NovoLOG 100 UNIT/ML injection USE AS DIRECTED IN INSULIN PUMP; MAXIMUM DAILY DOSE 100 UNITS 9/24/21  Yes Bernarda Henry MD   Nutritional Supplements (VITAMIN D BOOSTER PO) Take  by mouth.   Yes Giselle Mcbride MD   Taclonex 0.005-0.064 % external suspension  12/14/21  Yes Giselle Mcbride MD   NovoLOG 100 UNIT/ML injection USE AS DIRECTED IN INSULIN PUMP; MAXIMUM DAILY DOSE 100 UNITS 10/2/23   Bernarda Henry MD         Lab Results   Component Value Date    HGBA1C 8.50 (H) 09/09/2023    HGBA1C 8.3 (H) 02/10/2023    HGBA1C 8.0 (H) 07/30/2022      Lab Results   Component Value Date    GLUCOSE 269 (H) 02/10/2023    BUN 10 02/10/2023    CREATININE 0.89 02/10/2023    EGFRIFNONA 72 12/22/2021    BCR 11.2 02/10/2023    K 4.7 02/10/2023    CO2 31.0 (H) 02/10/2023    CALCIUM 9.6 02/10/2023    ALBUMIN 4.6 02/10/2023    " LABIL2 1.3 09/25/2018    AST 16 02/10/2023    ALT 17 02/10/2023    CHOL 147 02/10/2023    LDL 81 02/10/2023    HDL 53 02/10/2023    TRIG 62 02/10/2023     Lab Results   Component Value Date    TSH 0.457 02/10/2023    [id number] 32.6 09/09/2023       Assessment & Plan     Diagnoses and all orders for this visit:    1. Type 1 diabetes mellitus with hyperglycemia (Primary) - not at goal  -     Ambulatory Referral to Diabetic Education  -     Hemoglobin A1c; Future  -     Microalbumin / Creatinine Urine Ratio - Urine, Clean Catch; Future  -     Glucagon (Baqsimi One Pack) 3 MG/DOSE powder; 3 mg into the nostril(s) as directed by provider As Needed (for severe hypoglycemic episode).  Dispense: 1 each; Refill: 1    2. Vitamin D deficiency - improved    3. Hyperlipidemia, unspecified hyperlipidemia type - will check status next visit  -     Comprehensive Metabolic Panel; Future  -     Lipid Panel; Future  -     TSH; Future    4. Insulin pump status    Wearing a 670G MiniMed insulin pump since June 19th, 2020. Switched to DexCom CGMS in April 2022.  Pt has agreed to wear the CGM device and share readings on a regular basis with our office.    Schedule pump preview after January, 2nd part.  Increase exercise as able.  Increase basal rate to 1.9 units/h from 9a-7p.  Continue atorvastatin.  Call if blood sugars are running under 100 or over 200.        Bernarda Henry MD  10/03/23  20:21 EDT

## 2023-11-16 DIAGNOSIS — E10.65 TYPE 1 DIABETES MELLITUS WITH HYPERGLYCEMIA: ICD-10-CM

## 2023-11-17 NOTE — TELEPHONE ENCOUNTER
Hub staff attempted to follow warm transfer process and was unsuccessful     Caller: marlyn link    Relationship to patient: Emergency Contact    Best call back number: [phone]    Patient is needing: PHARMACY SENT OVER REQUEST FOR PRIOR AUTH ON DEXCOM G7 SENSORS. PLEASE CALL PT TO ADVISE

## 2023-11-30 RX ORDER — ACYCLOVIR 400 MG/1
1 TABLET ORAL
Qty: 9 EACH | Refills: 3 | Status: SHIPPED | OUTPATIENT
Start: 2023-11-30

## 2024-01-16 ENCOUNTER — TELEPHONE (OUTPATIENT)
Dept: ENDOCRINOLOGY | Facility: CLINIC | Age: 54
End: 2024-01-16
Payer: COMMERCIAL

## 2024-01-16 NOTE — TELEPHONE ENCOUNTER
Hub staff attempted to follow warm transfer process and was unsuccessful     Caller: Caryn Gallo    Relationship to patient: Self    Best call back number: [phone] (home) [phone] (work)      Patient is needing: PT NEEDS TO SCHEDULE A TRAINING SESSION FOR HER NEW PUMP.

## 2024-01-18 ENCOUNTER — TELEPHONE (OUTPATIENT)
Dept: ENDOCRINOLOGY | Facility: CLINIC | Age: 54
End: 2024-01-18
Payer: COMMERCIAL

## 2024-01-18 NOTE — TELEPHONE ENCOUNTER
NAME OF PUMP NOT LISTED - LFT VM FOR PT - Last office note it states she is on medtronic pump - gave the medtronic  bossman number - told pt if this is not the pump she is on she will have to call back and let us know so we can get her updated info

## 2024-01-18 NOTE — TELEPHONE ENCOUNTER
Pt returned call about pump - pt stated she doesn't have new pump yet, her pump is up for renewal, pt is thinking about switching and would like to attend the pump class - pt can't do 1/25 class, I dont have pump preview for feb yet, will call pt when it is available

## 2024-01-18 NOTE — TELEPHONE ENCOUNTER
Hub staff attempted to follow warm transfer process and was unsuccessful     Caller: Caryn Gallo    Relationship to patient: Self    Best call back number: [phone]     Patient is needing: THE PT WAS RETURNING A CALL TO [facility], SHE WANTED TO SIGN UP FOR THE INSULIN PUMP CLASS. AFTER 230 WOULD BE A GOOD TIME TO GIVE HER A CALL BACK.

## 2024-02-28 ENCOUNTER — TELEPHONE (OUTPATIENT)
Dept: ENDOCRINOLOGY | Facility: CLINIC | Age: 54
End: 2024-02-28
Payer: COMMERCIAL

## 2024-03-04 ENCOUNTER — TELEPHONE (OUTPATIENT)
Dept: ENDOCRINOLOGY | Facility: CLINIC | Age: 54
End: 2024-03-04
Payer: COMMERCIAL

## 2024-03-04 NOTE — TELEPHONE ENCOUNTER
LVM for pt to let know that next PP will be 3/28/24 @ 9:30 AM. Asked that pt call us back to let us know if she wants to attend.     Pt returned call to let us know she would like to attend. Will add to schedule.

## 2024-03-28 ENCOUNTER — OFFICE VISIT (OUTPATIENT)
Dept: ENDOCRINOLOGY | Facility: CLINIC | Age: 54
End: 2024-03-28
Payer: COMMERCIAL

## 2024-03-28 ENCOUNTER — LAB (OUTPATIENT)
Dept: LAB | Facility: HOSPITAL | Age: 54
End: 2024-03-28
Payer: COMMERCIAL

## 2024-03-28 DIAGNOSIS — E78.5 HYPERLIPIDEMIA, UNSPECIFIED HYPERLIPIDEMIA TYPE: ICD-10-CM

## 2024-03-28 DIAGNOSIS — E10.65 TYPE 1 DIABETES MELLITUS WITH HYPERGLYCEMIA: Primary | ICD-10-CM

## 2024-03-28 DIAGNOSIS — E10.65 TYPE 1 DIABETES MELLITUS WITH HYPERGLYCEMIA: ICD-10-CM

## 2024-03-28 LAB
ALBUMIN SERPL-MCNC: 4.8 G/DL (ref 3.5–5.2)
ALBUMIN UR-MCNC: <1.2 MG/DL
ALBUMIN/GLOB SERPL: 2.3 G/DL
ALP SERPL-CCNC: 92 U/L (ref 39–117)
ALT SERPL W P-5'-P-CCNC: 16 U/L (ref 1–33)
ANION GAP SERPL CALCULATED.3IONS-SCNC: 11 MMOL/L (ref 5–15)
AST SERPL-CCNC: 17 U/L (ref 1–32)
BILIRUB SERPL-MCNC: 0.4 MG/DL (ref 0–1.2)
BUN SERPL-MCNC: 8 MG/DL (ref 6–20)
BUN/CREAT SERPL: 9 (ref 7–25)
CALCIUM SPEC-SCNC: 9.4 MG/DL (ref 8.6–10.5)
CHLORIDE SERPL-SCNC: 101 MMOL/L (ref 98–107)
CHOLEST SERPL-MCNC: 147 MG/DL (ref 0–200)
CO2 SERPL-SCNC: 29 MMOL/L (ref 22–29)
CREAT SERPL-MCNC: 0.89 MG/DL (ref 0.57–1)
CREAT UR-MCNC: 63.6 MG/DL
EGFRCR SERPLBLD CKD-EPI 2021: 78.6 ML/MIN/1.73
GLOBULIN UR ELPH-MCNC: 2.1 GM/DL
GLUCOSE SERPL-MCNC: 176 MG/DL (ref 65–99)
HBA1C MFR BLD: 7.2 % (ref 4.8–5.6)
HDLC SERPL-MCNC: 44 MG/DL (ref 40–60)
LDLC SERPL CALC-MCNC: 87 MG/DL (ref 0–100)
LDLC/HDLC SERPL: 1.95 {RATIO}
MICROALBUMIN/CREAT UR: NORMAL MG/G{CREAT}
POTASSIUM SERPL-SCNC: 4.3 MMOL/L (ref 3.5–5.2)
PROT SERPL-MCNC: 6.9 G/DL (ref 6–8.5)
SODIUM SERPL-SCNC: 141 MMOL/L (ref 136–145)
TRIGL SERPL-MCNC: 85 MG/DL (ref 0–150)
TSH SERPL DL<=0.05 MIU/L-ACNC: 0.61 UIU/ML (ref 0.27–4.2)
VLDLC SERPL-MCNC: 16 MG/DL (ref 5–40)

## 2024-03-28 PROCEDURE — 80061 LIPID PANEL: CPT

## 2024-03-28 PROCEDURE — 80053 COMPREHEN METABOLIC PANEL: CPT

## 2024-03-28 PROCEDURE — 82570 ASSAY OF URINE CREATININE: CPT

## 2024-03-28 PROCEDURE — G0109 DIAB MANAGE TRN IND/GROUP: HCPCS | Performed by: INTERNAL MEDICINE

## 2024-03-28 PROCEDURE — 82043 UR ALBUMIN QUANTITATIVE: CPT

## 2024-03-28 PROCEDURE — 36415 COLL VENOUS BLD VENIPUNCTURE: CPT

## 2024-03-28 PROCEDURE — 84443 ASSAY THYROID STIM HORMONE: CPT

## 2024-03-28 PROCEDURE — 83036 HEMOGLOBIN GLYCOSYLATED A1C: CPT

## 2024-03-28 RX ORDER — INSULIN PMP CART,AUT,G6/7,CNTR
1 EACH SUBCUTANEOUS EVERY OTHER DAY
Qty: 1 KIT | Refills: 0 | Status: SHIPPED | OUTPATIENT
Start: 2024-03-28

## 2024-03-28 NOTE — PROGRESS NOTES
Pt here today from 9:00 AM-9:30 AM (30 min) for pump/GCM overview--discussed basal/bolus, pros and cons of pump therapy, and requirements. Pt then met with pump/CGM reps from [employer], [employer], Medtronic, and Dexcom from 9:30-11:00 AM. Patient requested Rx for Omnipod, order placed for intro kit only.  Will discuss further at training if she may want an Rx for a full box of pods.

## 2024-04-01 ENCOUNTER — OFFICE VISIT (OUTPATIENT)
Dept: INTERNAL MEDICINE | Age: 54
End: 2024-04-01
Payer: COMMERCIAL

## 2024-04-01 ENCOUNTER — OFFICE VISIT (OUTPATIENT)
Dept: ENDOCRINOLOGY | Facility: CLINIC | Age: 54
End: 2024-04-01
Payer: COMMERCIAL

## 2024-04-01 VITALS
SYSTOLIC BLOOD PRESSURE: 124 MMHG | WEIGHT: 197 LBS | HEIGHT: 66 IN | HEART RATE: 91 BPM | DIASTOLIC BLOOD PRESSURE: 76 MMHG | BODY MASS INDEX: 31.66 KG/M2 | OXYGEN SATURATION: 98 % | TEMPERATURE: 98.1 F

## 2024-04-01 VITALS
HEART RATE: 96 BPM | WEIGHT: 196 LBS | SYSTOLIC BLOOD PRESSURE: 110 MMHG | BODY MASS INDEX: 31.5 KG/M2 | HEIGHT: 66 IN | DIASTOLIC BLOOD PRESSURE: 72 MMHG | OXYGEN SATURATION: 95 %

## 2024-04-01 DIAGNOSIS — R23.2 HOT FLASHES: ICD-10-CM

## 2024-04-01 DIAGNOSIS — Z91.09 ENVIRONMENTAL ALLERGIES: ICD-10-CM

## 2024-04-01 DIAGNOSIS — I10 HYPERTENSION, UNSPECIFIED TYPE: ICD-10-CM

## 2024-04-01 DIAGNOSIS — R06.83 SNORING: ICD-10-CM

## 2024-04-01 DIAGNOSIS — E78.5 HYPERLIPIDEMIA, UNSPECIFIED HYPERLIPIDEMIA TYPE: ICD-10-CM

## 2024-04-01 DIAGNOSIS — Z23 NEED FOR PNEUMOCOCCAL VACCINATION: ICD-10-CM

## 2024-04-01 DIAGNOSIS — E78.5 HYPERLIPIDEMIA, UNSPECIFIED HYPERLIPIDEMIA TYPE: Primary | ICD-10-CM

## 2024-04-01 DIAGNOSIS — Z96.41 INSULIN PUMP STATUS: ICD-10-CM

## 2024-04-01 DIAGNOSIS — E55.9 VITAMIN D DEFICIENCY: ICD-10-CM

## 2024-04-01 DIAGNOSIS — E10.65 TYPE 1 DIABETES MELLITUS WITH HYPERGLYCEMIA: Primary | ICD-10-CM

## 2024-04-01 DIAGNOSIS — R53.83 OTHER FATIGUE: ICD-10-CM

## 2024-04-01 DIAGNOSIS — Z12.11 SCREENING FOR MALIGNANT NEOPLASM OF COLON: ICD-10-CM

## 2024-04-01 PROCEDURE — 99214 OFFICE O/P EST MOD 30 MIN: CPT | Performed by: INTERNAL MEDICINE

## 2024-04-01 RX ORDER — ATORVASTATIN CALCIUM 10 MG/1
10 TABLET, FILM COATED ORAL DAILY
Qty: 90 TABLET | Refills: 2 | Status: SHIPPED | OUTPATIENT
Start: 2024-04-01

## 2024-04-01 RX ORDER — FEXOFENADINE HCL AND PSEUDOEPHEDRINE HCI 180; 240 MG/1; MG/1
1 TABLET, EXTENDED RELEASE ORAL DAILY
Qty: 30 TABLET | Refills: 5 | Status: SHIPPED | OUTPATIENT
Start: 2024-04-01

## 2024-04-01 RX ORDER — LISINOPRIL 10 MG/1
10 TABLET ORAL DAILY
Qty: 90 TABLET | Refills: 1 | Status: SHIPPED | OUTPATIENT
Start: 2024-04-01

## 2024-04-01 RX ORDER — MONTELUKAST SODIUM 10 MG/1
10 TABLET ORAL NIGHTLY
Qty: 90 TABLET | Refills: 1 | Status: SHIPPED | OUTPATIENT
Start: 2024-04-01

## 2024-04-01 NOTE — PATIENT INSTRUCTIONS
Keep up the good work!  See eye doctor in Sept.  Continue exercise.  Continue same pump settings, atorvastatin & vit D supplements.  Call if blood sugars are running under 100 or over 200.  We will call you to set up OmniPod pump training.  F/u in 6 months, with labs prior.

## 2024-04-01 NOTE — PROGRESS NOTES
I N T E R N A L  M E D I C I N E  VIPIN LAI APRN      ENCOUNTER DATE:  04/01/2024    Caryn Gallo / 53 y.o. / female      CHIEF COMPLAINT / REASON FOR OFFICE VISIT     Establish Care, Hot Flashes, Hyperlipidemia, Hypertension, and Allergies      ASSESSMENT & PLAN     Problem List Items Addressed This Visit       Hyperlipidemia - Primary    Relevant Medications    atorvastatin (LIPITOR) 10 MG tablet    Hypertension    Relevant Medications    lisinopril (PRINIVIL,ZESTRIL) 10 MG tablet     Other Visit Diagnoses       Environmental allergies        Relevant Medications    montelukast (SINGULAIR) 10 MG tablet    fexofenadine-pseudoephedrine (Allegra-D Allergy & Congestion) 180-240 MG per 24 hr tablet    Screening for malignant neoplasm of colon        Relevant Orders    Cologuard - Stool, Per Rectum    Need for pneumococcal vaccination        Relevant Medications    Pneumococcal 20-Mónica Conj Vacc (PREVNAR-20) 0.5 ML suspension prefilled syringe vaccine    Snoring        Relevant Orders    Ambulatory Referral to Sleep Medicine    Other fatigue        Relevant Orders    CBC w AUTO Differential    Iron and TIBC    Ferritin    Vitamin B12    Follicle stimulating hormone    Luteinizing hormone    Prolactin    Testosterone Free Direct    Estrogens, Total    Hot flashes        Relevant Orders    Follicle stimulating hormone    Luteinizing hormone    Prolactin    Testosterone Free Direct    Estrogens, Total          Orders Placed This Encounter   Procedures    Cologuard - Stool, Per Rectum    Iron and TIBC    Ferritin    Vitamin B12    Follicle stimulating hormone    Luteinizing hormone    Prolactin    Testosterone Free Direct    Estrogens, Total    Ambulatory Referral to Sleep Medicine    CBC w AUTO Differential     New Medications Ordered This Visit   Medications    atorvastatin (LIPITOR) 10 MG tablet     Sig: Take 1 tablet by mouth Daily.     Dispense:  90 tablet     Refill:  2    lisinopril (PRINIVIL,ZESTRIL) 10 MG  "tablet     Sig: Take 1 tablet by mouth Daily.     Dispense:  90 tablet     Refill:  1    montelukast (SINGULAIR) 10 MG tablet     Sig: Take 1 tablet by mouth Every Night.     Dispense:  90 tablet     Refill:  1    fexofenadine-pseudoephedrine (Allegra-D Allergy & Congestion) 180-240 MG per 24 hr tablet     Sig: Take 1 tablet by mouth Daily.     Dispense:  30 tablet     Refill:  5    Pneumococcal 20-Mónica Conj Vacc (PREVNAR-20) 0.5 ML suspension prefilled syringe vaccine     Sig: Inject 0.5 mL into the appropriate muscle as directed by prescriber 1 (One) Time for 1 dose.     Dispense:  0.5 mL     Refill:  0       SUMMARY/DISCUSSION  Continue current medication regimens for hyperlipidemia and hypertension.  Referral to sleep medicine for sleep apnea testing for snoring and fatigue  Fatigue lab workup.  Hormone workup for hot flashes   will obtain Prevnar 20 and pharmacy downstairs    Next Appointment with me: Visit date not found    Return in about 6 months (around 10/1/2024) for Annual physical.      VITAL SIGNS     Visit Vitals  /76 (BP Location: Left arm, Patient Position: Sitting, Cuff Size: Adult)   Pulse 91   Temp 98.1 °F (36.7 °C) (Temporal)   Ht 167.6 cm (66\")   Wt 89.4 kg (197 lb)   SpO2 98%   BMI 31.80 kg/m²     Wt Readings from Last 3 Encounters:   04/01/24 89.4 kg (197 lb)   04/01/24 88.9 kg (196 lb)   09/18/23 89.8 kg (198 lb)     Body mass index is 31.8 kg/m².    MEDICATIONS AT THE TIME OF OFFICE VISIT     Current Outpatient Medications on File Prior to Visit   Medication Sig    Continuous Blood Gluc Sensor (Dexcom G7 Sensor) misc Use 1 each Every 10 (Ten) Days.    Contour Next Test test strip CHECK BLOOD SUGAR FOUR TIMES A DAY BEFORE MEALS AND AT BEDTIME    Glucagon (Baqsimi One Pack) 3 MG/DOSE powder 3 mg into the nostril(s) as directed by provider As Needed (for severe hypoglycemic episode).    Insulin Disposable Pump (Omnipod 5 G6 Intro, Gen 5,) kit Use 1 each Every Other Day.    Insulin " "Syringe-Needle U-100 (GNP Insulin Syringes 31Gx5/16\") 31G X 5/16\" 0.3 ML misc For insulin injections daily.    NovoLOG 100 UNIT/ML injection USE AS DIRECTED IN INSULIN PUMP; MAXIMUM DAILY DOSE 100 UNITS    Nutritional Supplements (VITAMIN D BOOSTER PO) Take 2,000 Units by mouth.    Taclonex 0.005-0.064 % external suspension     [DISCONTINUED] atorvastatin (LIPITOR) 10 MG tablet TAKE ONE TABLET BY MOUTH DAILY    [DISCONTINUED] fexofenadine (ALLEGRA) 180 MG tablet Take 1 tablet by mouth Daily.    [DISCONTINUED] lisinopril (PRINIVIL,ZESTRIL) 10 MG tablet 1 tablet Daily.    [DISCONTINUED] montelukast (SINGULAIR) 10 MG tablet Take 1 tablet by mouth Every Night.    [DISCONTINUED] Continuous Blood Gluc Transmit (Dexcom G6 Transmitter) misc USE CONTINUOUSLY TO MONITOR BLOOD SUGARS; REPLACE EVERY 90 DAYS (Patient not taking: Reported on 4/1/2024)    [DISCONTINUED] NovoLOG 100 UNIT/ML injection USE AS DIRECTED IN INSULIN PUMP; MAXIMUM DAILY DOSE 100 UNITS     No current facility-administered medications on file prior to visit.      HISTORY OF PRESENT ILLNESS     Patient presents to establish care with new provider in office.  ,  with 3 children.    Patient was seen by Dr. Henry. Patient is a type I diabetic, last hemoglobin A1c of 7.2 March 28, 2024, decreased from 8.56 months prior.  Was seen by the diabetic nurse for teaching for pump/GCM.  Chose OmniPod.  Eye exam scheduled for September.  On NovoLog.  Atorvastatin 10 mg for hyperlipidemia with last LDL of 87.  Vitamin D deficiency last level 32.6 September 2023.    Seen by her OB/GYN through [facility] Smith January 29, 2024.  Status post hysterectomy for fibroids and bleeding at age 37.  Mild night sweats at that time when she was seen.  Mammogram January 2024 benign.  Last Pap smear 2017 no history abnormal.  Maternal aunt with breast cancer in her 50s and maternal grandmother with ovarian cancer in her 50s.  3 children.  " "Mammogram ordered for 2025 at last office visit.  She would like her hormone levels today checked as she is having more severe hot flashes which are interrupting her sleep.    Cologuard completed August 2021.     Seasonal allergies overall controlled with Allegra-D daily, blood pressure has remained stable on medication.  On singular daily.  No wheezing or shortness of breath.    Complain of fatigue and snoring.    REVIEW OF SYSTEMS     Constitutional fatigue   ENT snoring   Resp neg  CV neg   Endo hot flashes     PHYSICAL EXAMINATION     Physical Exam  Constitutional  No distress  Cardiovascular Rate  normal . Rhythm: regular . Heart sounds:  normal  Pulmonary/Chest  Effort normal. Breath sounds:  normal  Psychiatric  Alert. Judgment and thought content normal. Mood normal      REVIEWED DATA     Labs:   Lab Results   Component Value Date    GLUCOSE 176 (H) 03/28/2024    BUN 8 03/28/2024    CREATININE 0.89 03/28/2024    EGFR 78.6 03/28/2024    BCR 9.0 03/28/2024    K 4.3 03/28/2024    CO2 29.0 03/28/2024    CALCIUM 9.4 03/28/2024    ALBUMIN 4.8 03/28/2024    BILITOT 0.4 03/28/2024    AST 17 03/28/2024    ALT 16 03/28/2024     No results found for: \"WBC\", \"HGB\", \"HCT\", \"MCV\", \"PLT\"    Lab Results   Component Value Date    CHOL 147 03/28/2024    TRIG 85 03/28/2024    HDL 44 03/28/2024    LDL 87 03/28/2024     Lab Results   Component Value Date    HGBA1C 7.20 (H) 03/28/2024     Lab Results   Component Value Date    TSH 0.613 03/28/2024     Brief Urine Lab Results  (Last result in the past 365 days)        Color   Clarity   Blood   Leuk Est   Nitrite   Protein   CREAT   Urine HCG        03/28/24 0836             63.6               Imaging:           Medical Tests:           Summary of old records / correspondence / consultant report:           Request outside records:             *Dragon dictation used for documentation.   "

## 2024-04-02 NOTE — PROGRESS NOTES
[facility]        Patient Care Team:  Ken Roberts, DNP, APRN as PCP - General (Internal Medicine)  Bernarda Henry MD as Consulting Physician (Endocrinology)  Martha Smith MD (Obstetrics and Gynecology)  Riya Busby MD as Consulting Physician (Dermatology)    Chief Complaint:    Chief Complaint   Patient presents with    Diabetes     fU / DM type 1 /          Subjective   Here for diabetes f/u  Blood sugars: mid 100's  Insulin delivery per pump: Basal 60%/ bolus 40%  TDD 74.05 units  Attended pump preview: will switch to OmniPod  Exercise program: walking @ work  Taking vit D    Interval History:     Patient Complaints:  sinus congestion 2 wks ago. Took steroids  Patient Denies: hypoglycemia  History taken from: patient    Review of Systems:   Review of Systems   Eyes:  Negative for blurred vision.   Gastrointestinal:  Negative for nausea.   Endocrine: Negative for polyuria.   Neurological:  Negative for headache.   Lost  2 lb since last visit    Objective     Vital Signs  Temp:  [98.1 °F (36.7 °C)] 98.1 °F (36.7 °C)  Heart Rate:  [91-96] 91  BP: (110-124)/(72-76) 124/76    Physical Exam:     General Appearance:    Alert, cooperative, in no acute distress. Obese   Head:    Normocephalic, without obvious abnormality, atraumatic   Eyes:            Lids and lashes normal, conjunctivae and sclerae normal, no   icterus, no pallor, corneas clear, PERRLA   Throat:   No oral lesions,  oral mucosa moist   Neck:   No adenopathy, supple,  no thyromegaly, no carotid bruit   Lungs:     Clear     Heart:    Regular rhythm and normal rate   Chest Wall:    No abnormalities observed   Abdomen:     Normal bowel sounds, soft                 Extremities:   Moves all extremities well, no edema               Pulses:   Pulses palpable and equal bilaterally   Skin:   Pump site clean   Neurologic:  DTR 1+ in ankles, normal vibratory sense, able to feel the 10g monofilament          Results Review:   "  I have reviewed the patient's new clinical results, labs & imaging.    Medication Review:   Prior to Admission medications    Medication Sig Start Date End Date Taking? Authorizing Provider   Continuous Blood Gluc Sensor (Dexcom G7 Sensor) misc Use 1 each Every 10 (Ten) Days. 11/30/23  Yes Bernarda Henry MD   Contour Next Test test strip CHECK BLOOD SUGAR FOUR TIMES A DAY BEFORE MEALS AND AT BEDTIME 1/13/22  Yes Bernarda Henry MD   Glucagon (Baqsimi One Pack) 3 MG/DOSE powder 3 mg into the nostril(s) as directed by provider As Needed (for severe hypoglycemic episode). 9/18/23  Yes Bernarda Henry MD   Insulin Disposable Pump (Omnipod 5 G6 Intro, Gen 5,) kit Use 1 each Every Other Day. 3/28/24  Yes Bernarda Henry MD   Insulin Syringe-Needle U-100 (GNP Insulin Syringes 31Gx5/16\") 31G X 5/16\" 0.3 ML misc For insulin injections daily. 8/9/22  Yes Bernarda Henry MD   NovoLOG 100 UNIT/ML injection USE AS DIRECTED IN INSULIN PUMP; MAXIMUM DAILY DOSE 100 UNITS 10/2/23  Yes Bernarda Henry MD   Nutritional Supplements (VITAMIN D BOOSTER PO) Take 2,000 Units by mouth.   Yes Giselle Mcbride MD   Taclonex 0.005-0.064 % external suspension  12/14/21  Yes Giselle Mcbride MD   atorvastatin (LIPITOR) 10 MG tablet TAKE ONE TABLET BY MOUTH DAILY 7/13/20 4/1/24 Yes Bernarda Henry MD   fexofenadine (ALLEGRA) 180 MG tablet Take 1 tablet by mouth Daily.  4/1/24 Yes Giselle Mcbride MD   lisinopril (PRINIVIL,ZESTRIL) 10 MG tablet 1 tablet Daily. 8/8/19 4/1/24 Yes Giselle Mcbride MD   montelukast (SINGULAIR) 10 MG tablet Take 1 tablet by mouth Every Night.  4/1/24 Yes Giselle Mcbride MD   atorvastatin (LIPITOR) 10 MG tablet Take 1 tablet by mouth Daily. 4/1/24   Ken Roberts, DNP, APRN   fexofenadine-pseudoephedrine (Allegra-D Allergy & Congestion) 180-240 MG per 24 hr tablet Take 1 tablet by mouth Daily. 4/1/24   Ken Roberts DNP, APRN   lisinopril (PRINIVIL,ZESTRIL) 10 MG tablet " Take 1 tablet by mouth Daily. 4/1/24   Ken Roberts DNP, APRN   montelukast (SINGULAIR) 10 MG tablet Take 1 tablet by mouth Every Night. 4/1/24   Ken Roberts DNP, APRN   Pneumococcal 20-Mónica Conj Vacc (PREVNAR-20) 0.5 ML suspension prefilled syringe vaccine Inject 0.5 mL into the appropriate muscle as directed by prescriber 1 (One) Time for 1 dose. 4/1/24 4/2/24  Ken Roberts DNP, APRN   Continuous Blood Gluc Transmit (Dexcom G6 Transmitter) misc USE CONTINUOUSLY TO MONITOR BLOOD SUGARS; REPLACE EVERY 90 DAYS  Patient not taking: Reported on 4/1/2024 4/5/23 4/1/24  Bernarda Henry MD   NovoLOG 100 UNIT/ML injection USE AS DIRECTED IN INSULIN PUMP; MAXIMUM DAILY DOSE 100 UNITS 9/24/21 4/1/24  Bernarda Henry MD         Lab Results   Component Value Date    HGBA1C 7.20 (H) 03/28/2024    HGBA1C 8.50 (H) 09/09/2023    HGBA1C 8.3 (H) 02/10/2023      Lab Results   Component Value Date    GLUCOSE 176 (H) 03/28/2024    BUN 8 03/28/2024    CREATININE 0.89 03/28/2024    EGFRIFNONA 72 12/22/2021    BCR 9.0 03/28/2024    K 4.3 03/28/2024    CO2 29.0 03/28/2024    CALCIUM 9.4 03/28/2024    ALBUMIN 4.8 03/28/2024    LABIL2 1.3 09/25/2018    AST 17 03/28/2024    ALT 16 03/28/2024    CHOL 147 03/28/2024    LDL 87 03/28/2024    HDL 44 03/28/2024    TRIG 85 03/28/2024     Lab Results   Component Value Date    TSH 0.613 03/28/2024    SNAN30DL 32.6 09/09/2023       Assessment & Plan     Diagnoses and all orders for this visit:    1. Type 1 diabetes mellitus with hyperglycemia (Primary) - improved  -     Hemoglobin A1c; Future    2. Vitamin D deficiency - will check status next visit  -     Vitamin D,25-Hydroxy; Future    3. Hyperlipidemia, unspecified hyperlipidemia type - stable  -     Comprehensive Metabolic Panel; Future    4. Insulin pump status    Wearing a 670G MiniMed insulin pump since June 19th, 2020. Using DexCom G7 CGMS   Pt has agreed to wear the CGM device and share readings on a regular basis with our  office.    See eye doctor in Sept.  Continue exercise.  Continue same pump settings, atorvastatin & vit D supplements.  Call if blood sugars are running under 100 or over 200.  We will call you to set up OmniPod pump training.        Bernarda Henry MD  04/01/24  23:04 EDT

## 2024-04-03 LAB
BASOPHILS # BLD AUTO: 0.04 10*3/MM3 (ref 0–0.2)
BASOPHILS NFR BLD AUTO: 0.6 % (ref 0–1.5)
EOSINOPHIL # BLD AUTO: 0.07 10*3/MM3 (ref 0–0.4)
EOSINOPHIL NFR BLD AUTO: 1 % (ref 0.3–6.2)
ERYTHROCYTE [DISTWIDTH] IN BLOOD BY AUTOMATED COUNT: 11.8 % (ref 12.3–15.4)
ESTROGEN SERPL-MCNC: 173 PG/ML
FERRITIN SERPL-MCNC: 107 NG/ML (ref 13–150)
FSH SERPL-ACNC: 61.4 MIU/ML
HCT VFR BLD AUTO: 46.8 % (ref 34–46.6)
HGB BLD-MCNC: 15.7 G/DL (ref 12–15.9)
IMM GRANULOCYTES # BLD AUTO: 0.02 10*3/MM3 (ref 0–0.05)
IMM GRANULOCYTES NFR BLD AUTO: 0.3 % (ref 0–0.5)
IRON SATN MFR SERPL: 25 % (ref 20–50)
IRON SERPL-MCNC: 97 MCG/DL (ref 37–145)
LH SERPL-ACNC: 33.9 MIU/ML
LYMPHOCYTES # BLD AUTO: 1.87 10*3/MM3 (ref 0.7–3.1)
LYMPHOCYTES NFR BLD AUTO: 28 % (ref 19.6–45.3)
MCH RBC QN AUTO: 29.6 PG (ref 26.6–33)
MCHC RBC AUTO-ENTMCNC: 33.5 G/DL (ref 31.5–35.7)
MCV RBC AUTO: 88.3 FL (ref 79–97)
MONOCYTES # BLD AUTO: 0.65 10*3/MM3 (ref 0.1–0.9)
MONOCYTES NFR BLD AUTO: 9.7 % (ref 5–12)
NEUTROPHILS # BLD AUTO: 4.02 10*3/MM3 (ref 1.7–7)
NEUTROPHILS NFR BLD AUTO: 60.4 % (ref 42.7–76)
NRBC BLD AUTO-RTO: 0 /100 WBC (ref 0–0.2)
PLATELET # BLD AUTO: 300 10*3/MM3 (ref 140–450)
PROLACTIN SERPL-MCNC: 7.8 NG/ML (ref 3.6–25.2)
RBC # BLD AUTO: 5.3 10*6/MM3 (ref 3.77–5.28)
TESTOST FREE SERPL-MCNC: 1.1 PG/ML (ref 0–4.2)
TIBC SERPL-MCNC: 381 MCG/DL
UIBC SERPL-MCNC: 284 MCG/DL (ref 112–346)
VIT B12 SERPL-MCNC: 324 PG/ML (ref 211–946)
WBC # BLD AUTO: 6.67 10*3/MM3 (ref 3.4–10.8)

## 2024-04-04 DIAGNOSIS — E66.9 OBESITY (BMI 30-39.9): Primary | ICD-10-CM

## 2024-04-05 DIAGNOSIS — E10.65 TYPE 1 DIABETES MELLITUS WITH HYPERGLYCEMIA: Primary | ICD-10-CM

## 2024-04-05 RX ORDER — PROCHLORPERAZINE 25 MG/1
1 SUPPOSITORY RECTAL CONTINUOUS
Qty: 1 EACH | Refills: 3 | Status: SHIPPED | OUTPATIENT
Start: 2024-04-05

## 2024-04-05 RX ORDER — PROCHLORPERAZINE 25 MG/1
SUPPOSITORY RECTAL
Qty: 9 EACH | Refills: 3 | Status: SHIPPED | OUTPATIENT
Start: 2024-04-05

## 2024-04-18 DIAGNOSIS — E66.9 OBESITY (BMI 30-39.9): ICD-10-CM

## 2024-04-24 ENCOUNTER — TELEPHONE (OUTPATIENT)
Dept: ENDOCRINOLOGY | Facility: CLINIC | Age: 54
End: 2024-04-24
Payer: COMMERCIAL

## 2024-04-24 NOTE — TELEPHONE ENCOUNTER
Hub staff attempted to follow warm transfer process and was unsuccessful     Caller: TOMMY NEVES    Relationship to patient: Other    Best call back number: [phone]    Patient is needing: TOMMY CALLED TO CHECK THE STATUS OF AN ORDER FROM THAT WAS SENT THROUGH [place] . ASKED FOR A CALL BACK ON THE STATUS OF THE ORDER FORM

## 2024-05-09 NOTE — TELEPHONE ENCOUNTER
Hpi Title: Evaluation of Skin Lesions In green folder for MD signature and will be faxed to [employer]

## 2024-05-13 DIAGNOSIS — E66.9 OBESITY (BMI 30-39.9): ICD-10-CM

## 2024-05-15 RX ORDER — INSULIN ASPART 100 [IU]/ML
INJECTION, SOLUTION INTRAVENOUS; SUBCUTANEOUS
Qty: 30 ML | Refills: 6 | Status: SHIPPED | OUTPATIENT
Start: 2024-05-15

## 2024-05-21 DIAGNOSIS — E10.65 TYPE 1 DIABETES MELLITUS WITH HYPERGLYCEMIA: ICD-10-CM

## 2024-05-21 RX ORDER — INSULIN PMP CART,AUT,G6/7,CNTR
1 EACH SUBCUTANEOUS EVERY OTHER DAY
Qty: 1 KIT | Refills: 0 | Status: CANCELLED | OUTPATIENT
Start: 2024-05-21

## 2024-05-22 DIAGNOSIS — E10.65 TYPE 1 DIABETES MELLITUS WITH HYPERGLYCEMIA: ICD-10-CM

## 2024-05-22 RX ORDER — INSULIN PMP CART,AUT,G6/7,CNTR
1 EACH SUBCUTANEOUS EVERY OTHER DAY
Qty: 45 EACH | Refills: 3 | Status: SHIPPED | OUTPATIENT
Start: 2024-05-22

## 2024-05-22 RX ORDER — INSULIN PMP CART,AUT,G6/7,CNTR
1 EACH SUBCUTANEOUS EVERY OTHER DAY
Qty: 1 KIT | Refills: 0 | Status: CANCELLED | OUTPATIENT
Start: 2024-05-22

## 2024-05-22 NOTE — TELEPHONE ENCOUNTER
Talked to pt.  She doesn't need the kit, just the Pods.  Rx sent for the Pods  I cancelled the request for the kit refill.

## 2024-05-30 ENCOUNTER — OFFICE VISIT (OUTPATIENT)
Dept: SLEEP MEDICINE | Facility: HOSPITAL | Age: 54
End: 2024-05-30
Payer: COMMERCIAL

## 2024-05-30 VITALS — WEIGHT: 173.6 LBS | HEART RATE: 98 BPM | HEIGHT: 66 IN | BODY MASS INDEX: 27.9 KG/M2 | OXYGEN SATURATION: 99 %

## 2024-05-30 DIAGNOSIS — R06.83 SNORING: Primary | ICD-10-CM

## 2024-05-30 DIAGNOSIS — G47.33 OSA (OBSTRUCTIVE SLEEP APNEA): ICD-10-CM

## 2024-05-30 DIAGNOSIS — E66.3 OVERWEIGHT: ICD-10-CM

## 2024-05-30 DIAGNOSIS — Z72.821 INADEQUATE SLEEP HYGIENE: ICD-10-CM

## 2024-05-30 DIAGNOSIS — I10 HYPERTENSION, UNSPECIFIED TYPE: ICD-10-CM

## 2024-05-30 PROCEDURE — G0463 HOSPITAL OUTPT CLINIC VISIT: HCPCS

## 2024-05-30 NOTE — PROGRESS NOTES
"  Reason for Consultation: Fatigue        Patient Care Team:  Ken Roberts, POP, APRN as PCP - General (Internal Medicine)  Bernarda Henry MD as Consulting Physician (Endocrinology)  Martha Smith MD (Obstetrics and Gynecology)  Riya Busby MD as Consulting Physician (Dermatology)  Caitlin Prasad MD, MPH as Consulting Physician (Sleep Medicine)      History of present illness:    Thank you for asking me to see your patient.  The patient is a 53 y.o. female who is tired or fatigued and her primary care doctor thinks that she might have sleep problems.  The patient for her part does not notice any.  She goes to bed habitually at 11 PM gets up at 6 AM during the week and has slept 7 hours she estimates it takes 30 minutes to fall asleep but she is tired and takes 0-1 naps per day on the weekends she was to bed at midnight gets up at 8 AM and estimates she slept 8 hours and feels a little better in fact \"okay\".  In discussing her sleep she says that her  who has sleep apnea has a CPAP machine.  Previously he snored and would stop breathing but his CPAP machine bothers her both through a combination leaks which she can hear and when he turns towards her she can feel the air, from the mask.  Presumably this is the venting from the mask.  She does awaken with a sore throat and she does grind her teeth at night she does not have any sleepwalking nor dream enactment.  She awakens 2 times per night to go to the bathroom and she has difficulty staying asleep during the night.  She has high blood pressure and diabetes and hyperlipidemia.  She has a family history of obesity.  She works as an  and is never used tobacco.  She has 1 cup of tea per day and no alcohol.  She is never had a prior sleep study    [place]: 14    Data Reviewed: Reviewed her sleep questionnaire.      PMH:  Past Medical History:   Diagnosis Date    Diabetes insipidus     Diabetes mellitus type I     Hyperlipidemia     " "Hypertension     Vitamin D deficiency           Allergies:  Patient has no known allergies.     Medication Review:   Current Outpatient Medications on File Prior to Visit   Medication Sig Dispense Refill    atorvastatin (LIPITOR) 10 MG tablet Take 1 tablet by mouth Daily. 90 tablet 2    Continuous Blood Gluc Sensor (Dexcom G6 Sensor) Use Every 10 (Ten) Days. 9 each 3    Continuous Blood Gluc Transmit (Dexcom G6 Transmitter) misc Use 1 Device Continuous. 1 each 3    Contour Next Test test strip CHECK BLOOD SUGAR FOUR TIMES A DAY BEFORE MEALS AND AT BEDTIME 150 each 5    fexofenadine-pseudoephedrine (Allegra-D Allergy & Congestion) 180-240 MG per 24 hr tablet Take 1 tablet by mouth Daily. 30 tablet 5    Glucagon (Baqsimi One Pack) 3 MG/DOSE powder 3 mg into the nostril(s) as directed by provider As Needed (for severe hypoglycemic episode). 1 each 1    Insulin Disposable Pump (Omnipod 5 G6 Intro, Gen 5,) kit Use 1 each Every Other Day. 1 kit 0    Insulin Disposable Pump (Omnipod 5 G6 Pods, Gen 5,) misc Use 1 each Every Other Day. E10.65 45 each 3    Insulin Syringe-Needle U-100 (GNP Insulin Syringes 31Gx5/16\") 31G X 5/16\" 0.3 ML misc For insulin injections daily. 100 each 3    lisinopril (PRINIVIL,ZESTRIL) 10 MG tablet Take 1 tablet by mouth Daily. 90 tablet 1    montelukast (SINGULAIR) 10 MG tablet Take 1 tablet by mouth Every Night. 90 tablet 1    NovoLOG 100 UNIT/ML injection USE AS DIRECTED IN INSULIN PUMP; MAXIMUM DOSE 100 UNITS PER DAY 30 mL 6    Nutritional Supplements (VITAMIN D BOOSTER PO) Take 2,000 Units by mouth.      Taclonex 0.005-0.064 % external suspension       Tirzepatide-Weight Management (ZEPBOUND) 5 MG/0.5ML solution auto-injector Inject 0.5 mL under the skin into the appropriate area as directed 1 (One) Time Per Week. 2 mL 0     No current facility-administered medications on file prior to visit.         Vital Signs:    Vitals:    05/30/24 1500   Pulse: 98   SpO2: 99%   Weight: 78.7 kg (173 lb 9.6 " "oz)   Height: 167.6 cm (66\")        Body mass index is 28.02 kg/m².  Neck Circumference: 14.5 inches      Physical Exam:    Constitutional:  Well developed 53 y.o. female that appears in no apparent distress.  Awake & oriented times 3.  Normal mood with normal recent and remote memory and normal judgement.  Eyes:  Conjunctivae normal.  Oropharynx: Moist mucous membranes without exudate and Mallampati 3 with macroglossia  Neck: Trachea midline  Respiratory: Effort is not labored  Cardiovascular: Radial pulse regular  Musculoskeletal: Gait appears normal, no digital clubbing evident, no pre-tibial edema        Impression:   Encounter Diagnoses   Name Primary?    Snoring Yes    Hypertension, unspecified type     RADHA (obstructive sleep apnea)     Overweight      Patient's BMI is Body mass index is 28.02 kg/m².    I think it certainly possible the patient has some degree of sleep disordered breathing.  And were going to get a home sleep apnea test to test that hypothesis.    There are a couple other variables that I am concerned about 1 is that I do not think she has adequate opportunity to sleep because she is only getting about 7 or perhaps 6 and half hours sleep during the week and on the weekends when she has 8 hours in bed she feels okay.  So I submit that part of her tiredness is an adequate opportunity for sleep.  I am also worried that her 's  mask leak and mask venting might be interfering with some of her sleep.    Plan:    Home sleep apnea test    The patient should practice good sleep hygiene measures.      Weight loss might be beneficial in this patient who has a Body mass index is 28.02 kg/m².      Pathophysiology of RADHA described to the patient.  Cardiovascular complications of untreated RADHA also reviewed.      The patient was cautioned about the dangers of drowsy driving.    Heriberto Prasad MD  Sleep Medicine  05/30/24  15:39 EDT         "

## 2024-06-05 DIAGNOSIS — E66.9 OBESITY (BMI 30-39.9): ICD-10-CM

## 2024-06-07 DIAGNOSIS — E66.9 OBESITY (BMI 30-39.9): ICD-10-CM

## 2024-06-07 RX ORDER — TIRZEPATIDE 5 MG/.5ML
INJECTION, SOLUTION SUBCUTANEOUS
Qty: 2 ML | Refills: 0 | OUTPATIENT
Start: 2024-06-07

## 2024-06-10 ENCOUNTER — HOSPITAL ENCOUNTER (OUTPATIENT)
Dept: SLEEP MEDICINE | Facility: HOSPITAL | Age: 54
Discharge: HOME | End: 2024-06-10
Admitting: PSYCHIATRY & NEUROLOGY
Payer: COMMERCIAL

## 2024-06-10 DIAGNOSIS — R06.83 SNORING: ICD-10-CM

## 2024-06-10 DIAGNOSIS — G47.33 OSA (OBSTRUCTIVE SLEEP APNEA): ICD-10-CM

## 2024-06-10 DIAGNOSIS — I10 HYPERTENSION, UNSPECIFIED TYPE: ICD-10-CM

## 2024-06-10 PROCEDURE — 95806 SLEEP STUDY UNATT&RESP EFFT: CPT

## 2024-06-24 ENCOUNTER — TELEPHONE (OUTPATIENT)
Dept: SLEEP MEDICINE | Facility: HOSPITAL | Age: 54
End: 2024-06-24
Payer: COMMERCIAL

## 2024-06-24 DIAGNOSIS — I10 HYPERTENSION, UNSPECIFIED TYPE: ICD-10-CM

## 2024-06-24 DIAGNOSIS — E66.3 OVERWEIGHT: ICD-10-CM

## 2024-06-24 DIAGNOSIS — G47.33 OSA (OBSTRUCTIVE SLEEP APNEA): Primary | ICD-10-CM

## 2024-07-05 DIAGNOSIS — E66.9 OBESITY (BMI 30-39.9): ICD-10-CM

## 2024-07-07 RX ORDER — TIRZEPATIDE 7.5 MG/.5ML
INJECTION, SOLUTION SUBCUTANEOUS
Qty: 2 ML | Refills: 0 | Status: SHIPPED | OUTPATIENT
Start: 2024-07-07

## 2024-07-09 DIAGNOSIS — E66.01 MORBID (SEVERE) OBESITY DUE TO EXCESS CALORIES: Primary | ICD-10-CM

## 2024-08-04 DIAGNOSIS — E66.9 OBESITY (BMI 30-39.9): ICD-10-CM

## 2024-08-04 RX ORDER — TIRZEPATIDE 7.5 MG/.5ML
INJECTION, SOLUTION SUBCUTANEOUS
Qty: 2 ML | Refills: 0 | OUTPATIENT
Start: 2024-08-04

## 2024-08-09 DIAGNOSIS — E66.9 OBESITY, UNSPECIFIED CLASSIFICATION, UNSPECIFIED OBESITY TYPE, UNSPECIFIED WHETHER SERIOUS COMORBIDITY PRESENT: Primary | ICD-10-CM

## 2024-09-08 DIAGNOSIS — E66.9 OBESITY, UNSPECIFIED CLASSIFICATION, UNSPECIFIED OBESITY TYPE, UNSPECIFIED WHETHER SERIOUS COMORBIDITY PRESENT: ICD-10-CM

## 2024-09-08 RX ORDER — TIRZEPATIDE 7.5 MG/.5ML
INJECTION, SOLUTION SUBCUTANEOUS
Qty: 2 ML | Refills: 0 | Status: SHIPPED | OUTPATIENT
Start: 2024-09-08

## 2024-09-18 ENCOUNTER — PATIENT MESSAGE (OUTPATIENT)
Dept: ENDOCRINOLOGY | Facility: CLINIC | Age: 54
End: 2024-09-18
Payer: COMMERCIAL

## 2024-09-19 ENCOUNTER — TELEPHONE (OUTPATIENT)
Dept: ENDOCRINOLOGY | Facility: CLINIC | Age: 54
End: 2024-09-19
Payer: COMMERCIAL

## 2024-09-28 ENCOUNTER — LAB (OUTPATIENT)
Dept: LAB | Facility: HOSPITAL | Age: 54
End: 2024-09-28
Payer: COMMERCIAL

## 2024-09-28 DIAGNOSIS — E78.5 HYPERLIPIDEMIA, UNSPECIFIED HYPERLIPIDEMIA TYPE: ICD-10-CM

## 2024-09-28 DIAGNOSIS — E10.65 TYPE 1 DIABETES MELLITUS WITH HYPERGLYCEMIA: ICD-10-CM

## 2024-09-28 DIAGNOSIS — E55.9 VITAMIN D DEFICIENCY: ICD-10-CM

## 2024-09-28 LAB
25(OH)D3 SERPL-MCNC: 27.6 NG/ML (ref 30–100)
ALBUMIN SERPL-MCNC: 4.2 G/DL (ref 3.5–5.2)
ALBUMIN/GLOB SERPL: 1.6 G/DL
ALP SERPL-CCNC: 77 U/L (ref 39–117)
ALT SERPL W P-5'-P-CCNC: 9 U/L (ref 1–33)
ANION GAP SERPL CALCULATED.3IONS-SCNC: 9 MMOL/L (ref 5–15)
AST SERPL-CCNC: 8 U/L (ref 1–32)
BILIRUB SERPL-MCNC: 0.5 MG/DL (ref 0–1.2)
BUN SERPL-MCNC: 8 MG/DL (ref 6–20)
BUN/CREAT SERPL: 8.2 (ref 7–25)
CALCIUM SPEC-SCNC: 9.6 MG/DL (ref 8.6–10.5)
CHLORIDE SERPL-SCNC: 100 MMOL/L (ref 98–107)
CO2 SERPL-SCNC: 29 MMOL/L (ref 22–29)
CREAT SERPL-MCNC: 0.98 MG/DL (ref 0.57–1)
EGFRCR SERPLBLD CKD-EPI 2021: 69.6 ML/MIN/1.73
GLOBULIN UR ELPH-MCNC: 2.6 GM/DL
GLUCOSE SERPL-MCNC: 227 MG/DL (ref 65–99)
HBA1C MFR BLD: 6.58 % (ref 4.8–5.6)
POTASSIUM SERPL-SCNC: 4.3 MMOL/L (ref 3.5–5.2)
PROT SERPL-MCNC: 6.8 G/DL (ref 6–8.5)
SODIUM SERPL-SCNC: 138 MMOL/L (ref 136–145)

## 2024-09-28 PROCEDURE — 83036 HEMOGLOBIN GLYCOSYLATED A1C: CPT

## 2024-09-28 PROCEDURE — 82306 VITAMIN D 25 HYDROXY: CPT

## 2024-09-28 PROCEDURE — 80053 COMPREHEN METABOLIC PANEL: CPT

## 2024-09-28 PROCEDURE — 36415 COLL VENOUS BLD VENIPUNCTURE: CPT

## 2024-09-30 ENCOUNTER — TELEPHONE (OUTPATIENT)
Dept: INTERNAL MEDICINE | Age: 54
End: 2024-09-30
Payer: COMMERCIAL

## 2024-09-30 DIAGNOSIS — Z00.00 PREVENTATIVE HEALTH CARE: ICD-10-CM

## 2024-09-30 DIAGNOSIS — Z11.59 NEED FOR HEPATITIS C SCREENING TEST: Primary | ICD-10-CM

## 2024-10-08 ENCOUNTER — OFFICE VISIT (OUTPATIENT)
Dept: ENDOCRINOLOGY | Facility: CLINIC | Age: 54
End: 2024-10-08
Payer: COMMERCIAL

## 2024-10-08 VITALS
OXYGEN SATURATION: 95 % | HEIGHT: 66 IN | WEIGHT: 152 LBS | DIASTOLIC BLOOD PRESSURE: 70 MMHG | BODY MASS INDEX: 24.43 KG/M2 | SYSTOLIC BLOOD PRESSURE: 108 MMHG | HEART RATE: 115 BPM

## 2024-10-08 DIAGNOSIS — Z96.41 INSULIN PUMP STATUS: ICD-10-CM

## 2024-10-08 DIAGNOSIS — E55.9 VITAMIN D DEFICIENCY: ICD-10-CM

## 2024-10-08 DIAGNOSIS — E10.65 TYPE 1 DIABETES MELLITUS WITH HYPERGLYCEMIA: Primary | ICD-10-CM

## 2024-10-08 DIAGNOSIS — E78.5 HYPERLIPIDEMIA, UNSPECIFIED HYPERLIPIDEMIA TYPE: ICD-10-CM

## 2024-10-08 PROCEDURE — 99214 OFFICE O/P EST MOD 30 MIN: CPT | Performed by: INTERNAL MEDICINE

## 2024-10-08 NOTE — PATIENT INSTRUCTIONS
Keep up the good work!  See eye doctor.  Continue exercise.  Adjust current wt on pump settings.  Restart vit D supplements.  Continue atorvastatin.  Call if blood sugars are running under 100 or over 200.  F/u in 6 months, with fasting labs prior.

## 2024-10-11 DIAGNOSIS — E66.9 OBESITY: ICD-10-CM

## 2024-10-11 RX ORDER — TIRZEPATIDE 7.5 MG/.5ML
INJECTION, SOLUTION SUBCUTANEOUS
Qty: 2 ML | Refills: 0 | Status: SHIPPED | OUTPATIENT
Start: 2024-10-11

## 2024-10-15 ENCOUNTER — OFFICE VISIT (OUTPATIENT)
Dept: INTERNAL MEDICINE | Age: 54
End: 2024-10-15
Payer: COMMERCIAL

## 2024-10-15 VITALS
HEIGHT: 66 IN | SYSTOLIC BLOOD PRESSURE: 108 MMHG | BODY MASS INDEX: 24.8 KG/M2 | OXYGEN SATURATION: 99 % | DIASTOLIC BLOOD PRESSURE: 76 MMHG | HEART RATE: 83 BPM | WEIGHT: 154.3 LBS | TEMPERATURE: 97.9 F

## 2024-10-15 DIAGNOSIS — Z23 NEED FOR TETANUS BOOSTER: ICD-10-CM

## 2024-10-15 DIAGNOSIS — Z12.11 SCREEN FOR COLON CANCER: Primary | ICD-10-CM

## 2024-10-15 DIAGNOSIS — Z00.00 ROUTINE ADULT HEALTH MAINTENANCE: ICD-10-CM

## 2024-10-15 PROCEDURE — 90715 TDAP VACCINE 7 YRS/> IM: CPT | Performed by: NURSE PRACTITIONER

## 2024-10-15 PROCEDURE — 99396 PREV VISIT EST AGE 40-64: CPT | Performed by: NURSE PRACTITIONER

## 2024-10-15 PROCEDURE — 90471 IMMUNIZATION ADMIN: CPT | Performed by: NURSE PRACTITIONER

## 2024-10-15 NOTE — PROGRESS NOTES
"[facility] [place] [facility]  DELON Pierre Lodi / 54 y.o. / female  10/15/2024      VITALS     Visit Vitals  /76   Pulse 83   Temp 97.9 °F (36.6 °C) (Temporal)   Ht 167.6 cm (65.98\")   Wt 70 kg (154 lb 4.8 oz)   SpO2 99%   BMI 24.92 kg/m²       BP Readings from Last 3 Encounters:   10/15/24 108/76   10/08/24 108/70   04/01/24 124/76     Wt Readings from Last 3 Encounters:   10/15/24 70 kg (154 lb 4.8 oz)   10/08/24 68.9 kg (152 lb)   05/30/24 78.7 kg (173 lb 9.6 oz)      Body mass index is 24.92 kg/m².    MEDICATIONS     Current Outpatient Medications   Medication Sig Dispense Refill    atorvastatin (LIPITOR) 10 MG tablet Take 1 tablet by mouth Daily. 90 tablet 2    Continuous Blood Gluc Sensor (Dexcom G6 Sensor) Use Every 10 (Ten) Days. 9 each 3    Continuous Blood Gluc Transmit (Dexcom G6 Transmitter) misc Use 1 Device Continuous. 1 each 3    Contour Next Test test strip CHECK BLOOD SUGAR FOUR TIMES A DAY BEFORE MEALS AND AT BEDTIME 150 each 5    Glucagon (Baqsimi One Pack) 3 MG/DOSE powder 3 mg into the nostril(s) as directed by provider As Needed (for severe hypoglycemic episode). 1 each 1    Insulin Disposable Pump (Omnipod 5 G6 Pods, Gen 5,) misc Use 1 each Every Other Day. E10.65 45 each 3    Insulin Syringe-Needle U-100 (GNP Insulin Syringes 31Gx5/16\") 31G X 5/16\" 0.3 ML misc For insulin injections daily. 100 each 3    lisinopril (PRINIVIL,ZESTRIL) 10 MG tablet Take 1 tablet by mouth Daily. 90 tablet 1    montelukast (SINGULAIR) 10 MG tablet Take 1 tablet by mouth Every Night. 90 tablet 1    NovoLOG 100 UNIT/ML injection USE AS DIRECTED IN INSULIN PUMP; MAXIMUM DOSE 100 UNITS PER DAY 30 mL 6    Nutritional Supplements (VITAMIN D BOOSTER PO) Take 2,000 Units by mouth.      Taclonex 0.005-0.064 % external suspension       Zepbound 7.5 MG/0.5ML solution auto-injector INJECT 7.5 MG UNDER THE SKIN ONCE WEEKLY 2 mL 0    fexofenadine-pseudoephedrine (Allegra-D Allergy & Congestion) 180-240 MG " per 24 hr tablet Take 1 tablet by mouth Daily. (Patient not taking: Reported on 10/15/2024) 30 tablet 5     No current facility-administered medications for this visit.       _____________________________________________________________________________________    CHIEF COMPLAINT     Annual Exam      HISTORY OF PRESENT ILLNESS     Caryn presents for annual health maintenance visit.    Zepbound controlling weight, now normalized.  Maintaining with 7.5 mg dose with no side effects.    Type 1 diabetes controlled with endocrinology.    Well woman exam through OB/GYN.    Last health maintenance visit: approximately 1 year ago  General health: good  Lifestyle:  Attempting to lose weight?: Yes   Diet: eats a well balanced, healthy diet  Exercise: generally stays active (work/exercise)  Tobacco: Never used   Alcohol: does not drink  Work: Full-time  Reproductive health:  Sexually active?: Yes   Sexual problems?: No problems  Concern for STD?: No    Sees Gynecologist?: No   [place]/Postmenopausal?: No   Domestic abuse concerns: No   Depression Screenin/1/2024    11:08 AM   PHQ-2/PHQ-9 Depression Screening   Retired Little Interest or Pleasure in Doing Things 0-->not at all   Retired Feeling Down, Depressed or Hopeless 0-->not at all   Retired PHQ-9: Brief Depression Severity Measure Score 0         PHQ-2: 0 (Not depressed)   PHQ-9: 0 (Negative screening for depression)    Patient Care Team:  Ken Roberts, POP, APRN as PCP - General (Internal Medicine)  Bernarda Henry MD as Consulting Physician (Endocrinology)  Martha Smith MD (Obstetrics and Gynecology)  Riya Busby MD as Consulting Physician (Dermatology)  ______________________________________________________________________    ALLERGIES  No Known Allergies     PFSH:     The following portions of the patient's history were reviewed and updated as appropriate: Allergies / Current Medications / Past Medical History / Surgical History / Social History  / Family History    PROBLEM LIST   Patient Active Problem List   Diagnosis    Hyperlipidemia    Hypertension    Insulin pump status    Orthopedic aftercare    Encounter for immunization    Type 1 diabetes mellitus with hyperglycemia    Vitamin D deficiency    Encounter for other orthopedic aftercare    Overweight    Inadequate sleep hygiene       PAST MEDICAL HISTORY  Past Medical History:   Diagnosis Date    Diabetes insipidus     Diabetes mellitus type I     Hyperlipidemia     Hypertension     Vitamin D deficiency        SURGICAL HISTORY  Past Surgical History:   Procedure Laterality Date     SECTION      HYSTERECTOMY      TUBAL ABDOMINAL LIGATION         SOCIAL HISTORY  Social History     Socioeconomic History    Marital status:      Spouse name: George    Number of children: 3    Years of education: 18   Tobacco Use    Smoking status: Never    Smokeless tobacco: Never   Vaping Use    Vaping status: Never Used   Substance and Sexual Activity    Alcohol use: No    Drug use: No    Sexual activity: Yes     Partners: Male     Birth control/protection: Surgical       FAMILY HISTORY  Family History   Problem Relation Age of Onset    Hypertension Mother     Melanoma Mother     Cancer Mother     Liver disease Mother     Hypertension Father     Hyperlipidemia Father     Hypertension Sister     Epilepsy Sister     Thyroid disease Sister     Birth defects Sister     Ovarian cancer Maternal Grandmother     Cancer Maternal Grandmother     Diabetes Paternal Grandfather     Hypertension Maternal Aunt     Breast cancer Maternal Aunt     Cancer Maternal Aunt     Stroke Maternal Grandfather        IMMUNIZATION HISTORY  Immunization History   Administered Date(s) Administered    COVID-19 (MODERNA) 12YRS+ (SPIKEVAX) 2023    COVID-19 (MODERNA) 1st,2nd,3rd Dose Monovalent 2021, 2021, 10/30/2021    COVID-19 (PFIZER) BIVALENT 12+YRS 09/10/2022    Flu Vaccine Quad PF >36MO 2020    Flu Vaccine Split  Quad 08/29/2020    Flublok 18+yrs 11/25/2022, 09/23/2023    Fluzone (or Fluarix & Flulaval for VFC) >6mos 10/23/2019, 08/29/2020, 09/10/2022    Influenza recombinant 09/29/2024    Influenza, Unspecified 09/17/2021, 10/05/2024    Pneumococcal Conjugate 20-Valent (PCV20) 04/01/2024    Pneumococcal, Unspecified 01/01/2000    Tdap 10/15/2024       ______________________________________________________________________    REVIEW OF SYSTEMS     Review of Systems   Constitutional: Negative.    HENT: Negative.     Eyes: Negative.    Respiratory: Negative.     Cardiovascular: Negative.    Gastrointestinal: Negative.    Endocrine: Negative.    Genitourinary: Negative.    Musculoskeletal: Negative.    Skin: Negative.    Allergic/Immunologic: Negative.    Neurological: Negative.    Hematological: Negative.    Psychiatric/Behavioral: Negative.       PHYSICAL EXAMINATION     Physical Exam  Constitutional:       Appearance: Normal appearance. She is normal weight.   HENT:      Head: Normocephalic and atraumatic.      Right Ear: Tympanic membrane normal.      Left Ear: Tympanic membrane normal.      Nose: Nose normal. No congestion.      Mouth/Throat:      Mouth: Mucous membranes are moist.      Pharynx: Oropharynx is clear.   Eyes:      Conjunctiva/sclera: Conjunctivae normal.   Neck:      Thyroid: No thyromegaly.      Vascular: No carotid bruit.   Cardiovascular:      Rate and Rhythm: Normal rate and regular rhythm.      Pulses: Normal pulses.      Heart sounds: Normal heart sounds.   Pulmonary:      Effort: Pulmonary effort is normal.      Breath sounds: Normal breath sounds.   Abdominal:      General: There is no distension.      Palpations: Abdomen is soft.      Tenderness: There is no abdominal tenderness. There is no right CVA tenderness, left CVA tenderness or guarding.   Genitourinary:     Comments: Followed by OB/GYN  Musculoskeletal:         General: Normal range of motion.      Cervical back: Normal range of motion.       "Right lower leg: No edema.      Left lower leg: No edema.   Lymphadenopathy:      Cervical: No cervical adenopathy.   Skin:     General: Skin is warm and dry.   Neurological:      Mental Status: She is alert and oriented to person, place, and time.      Cranial Nerves: No cranial nerve deficit.      Motor: No weakness.      Gait: Gait normal.      Deep Tendon Reflexes:      Reflex Scores:       Patellar reflexes are 2+ on the right side and 2+ on the left side.  Psychiatric:         Mood and Affect: Mood normal.         Behavior: Behavior normal.         Thought Content: Thought content normal.         Judgment: Judgment normal.       REVIEWED DATA      Labs:    Lab Results   Component Value Date     09/28/2024    K 4.3 09/28/2024    CALCIUM 9.6 09/28/2024    AST 8 09/28/2024    ALT 9 09/28/2024    BUN 8 09/28/2024    CREATININE 0.98 09/28/2024    CREATININE 0.89 03/28/2024    CREATININE 0.89 02/10/2023    EGFRIFNONA 72 12/22/2021       Lab Results   Component Value Date    HGBA1C 6.58 (H) 09/28/2024    HGBA1C 7.20 (H) 03/28/2024    HGBA1C 8.50 (H) 09/09/2023    TSH 0.613 03/28/2024       Lab Results   Component Value Date    LDL 87 03/28/2024    HDL 44 03/28/2024    TRIG 85 03/28/2024    CHOLHDLRATIO 3.06 09/21/2019       Lab Results   Component Value Date    EZKM45QJ 27.6 (L) 09/28/2024        Lab Results   Component Value Date    WBC 6.67 04/01/2024    HGB 15.7 04/01/2024    MCV 88.3 04/01/2024     04/01/2024       No results found for: \"PROTEIN\", \"GLUCOSEU\", \"BLOODU\", \"NITRITEU\", \"LEUKOCYTESUR\"     No results found for: \"HEPCVIRUSABY\"    Imaging:        Medical Tests:        ASSESSMENT & PLAN     ANNUAL WELLNESS EXAM / PHYSICAL       Summary/Discussion:     Continues at down for maintenance of weight.  Continue all specialty management, laboratory evaluation today  Tetanus today, recommend Shingrix at local pharmacy    Next Appointment with me: Visit date not found    Return in about 1 year (around " 10/15/2025) for Annual physical.      HEALTHCARE MAINTENANCE ISSUES     Cancer Screening:  Colon: Initial/Next screening at age: DUE NOW  Repeat colon cancer screening: every 3 years  Breast: Recommended monthly self exams; annual professional exam  Mammogram: every 1 year  Cervical: pelvic exam as recommended by gyne  Skin: Monthly self skin examination, annual exam by health professional  Lung: Does not meet criteria for lung cancer screening.   Other:    Screening Labs & Tests:  Lab results reviewed & discussed with the patient or test orders placed today.  EKG:  CV Screening: Lipid panel  DEXA (65+ or postmenopausal with risk factors):   HEP C (If born [id number], or risk factors): Negative screen  Other:     Immunization/Vaccinations (to be given today unless deferred by patient)  Influenza: Patient had the flu shot this season  Hepatitis A: Up to date  Tetanus/Pertussis: Up to date  Pneumovax: Up to date  Shingles: Recommended Shingrix vaccine  Other:     Lifestyle Counseling:  Lifestyle Modifications: Continue good lifestyle choices/modifications  Safety Issues: Always wear seatbelt, Avoid texting while driving   Use sunscreen, regular skin examination  Recommended annual dental/vision examination.  Emotional/Stress/Sleep: Reviewed and  given when appropriate      Health Maintenance   Topic Date Due    Hepatitis B (1 of 3 - 19+ 3-dose series) Never done    HEPATITIS C SCREENING  Never done    ANNUAL PHYSICAL  Never done    DIABETIC FOOT EXAM  04/02/2020    ZOSTER VACCINE (1 of 2) Never done    COLORECTAL CANCER SCREENING  08/03/2024    DIABETIC EYE EXAM  09/11/2024    LIPID PANEL  03/28/2025    HEMOGLOBIN A1C  03/28/2025    URINE MICROALBUMIN  03/28/2025    MAMMOGRAM  01/05/2026    TDAP/TD VACCINES (2 - Td or Tdap) 10/15/2034    COVID-19 Vaccine  Completed    Pneumococcal Vaccine 0-64  Completed    INFLUENZA VACCINE  Completed    PAP SMEAR  Discontinued         *Dragon dictation used for documentation

## 2024-10-15 NOTE — LETTER
[facility]  Vaccine Consent Form    Patient Name:  Caryn Gallo  Patient :  1970     Vaccine(s) Ordered    Tdap Vaccine => 6yo IM (BOOSTRIX/ADACEL)        Screening Checklist  The following questions should be completed prior to vaccination. If you answer “yes” to any question, it does not necessarily mean you should not be vaccinated. It just means we may need to clarify or ask more questions. If a question is unclear, please ask your healthcare provider to explain it.    Yes No   Any fever or moderate to severe illness today (mild illness and/or antibiotic treatment are not contraindications)?     Do you have a history of a serious reaction to any previous vaccinations, such as anaphylaxis, encephalopathy within 7 days, Guillain-[place] syndrome within 6 weeks, seizure?     Have you received any live vaccine(s) (e.g MMR, ISHA) or any other vaccines in the last month (to ensure duplicate doses aren't given)?     Do you have an anaphylactic allergy to latex (DTaP, DTaP-IPV, Hep A, Hep B, MenB, RV, Td, Tdap), baker’s yeast (Hep B, HPV), polysorbates (RSV, nirsevimab, PCV 20, Rotavirrus, Tdap, Shingrix), or gelatin (ISHA, MMR)?     Do you have an anaphylactic allergy to neomycin (Rabies, ISHA, MMR, IPV, Hep A), polymyxin B (IPV), or streptomycin (IPV)?      Any cancer, leukemia, AIDS, or other immune system disorder? (ISHA, MMR, RV)     Do you have a parent, brother, or sister with an immune system problem (if immune competence of vaccine recipient clinically verified, can proceed)? (MMR, ISHA)     Any recent steroid treatments for >2 weeks, chemotherapy, or radiation treatment? (ISHA, MMR)     Have you received antibody-containing blood transfusions or IVIG in the past 11 months (recommended interval is dependent on product)? (MMR, ISHA)     Have you taken antiviral drugs (acyclovir, famciclovir, valacyclovir for ISHA) in the last 24 or 48 hours, respectively?      Are you pregnant or planning to become  "pregnant within 1 month? (ISHA, MMR, HPV, IPV, MenB, Abrexvy; For Hep B- refer to Engerix-B; For RSV - Abrysvo is indicated for 32-36 weeks of pregnancy from September to January)     For infants, have you ever been told your child has had intussusception or a medical emergency involving obstruction of the intestine (Rotavirus)? If not for an infant, can skip this question.         *Ordering Physicians/APC should be consulted if \"yes\" is checked by the patient or guardian above.  I have received, read, and understand the Vaccine Information Statement (VIS) for each vaccine ordered.  I have considered my or my child's health status as well as the health status of my close contacts.  I have taken the opportunity to discuss my vaccine questions with my or my child's health care provider.   I have requested that the ordered vaccine(s) be given to me or my child.  I understand the benefits and risks of the vaccines.  I understand that I should remain in the clinic for 15 minutes after receiving the vaccine(s).  _________________________________________________________  Signature of Patient or Parent/Legal Guardian ____________________  Date     "

## 2024-10-20 NOTE — PROGRESS NOTES
[facility]        Patient Care Team:  Ken Roberts, POP, APRN as PCP - General (Internal Medicine)  Bernarda Henry MD as Consulting Physician (Endocrinology)  Martha Smith MD (Obstetrics and Gynecology)  Riya Busby MD as Consulting Physician (Dermatology)    Chief Complaint:    Chief Complaint   Patient presents with    Diabetes     Fu / DM type 1 /          Subjective   Here for diabetes f/u  Blood sugars: low 100's  Insulin delivery per pump: Basal 76%/ bolus 24%  0% auto mode (using DexCom G7, inadvertently)  Exercise program: walking  Off vit D for the summer    Interval History:     Patient Comments:  on Zepbound since April per PCP  Patient Denies: severe hypoglycemia  History taken from: patient    Review of Systems:   Review of Systems   Eyes:  Negative for blurred vision.   Gastrointestinal:  Negative for nausea.   Endocrine: Negative for polyuria.   Neurological:  Negative for headache.   Gained / Lost  44 lb since last visit    Objective     Vital Signs     Vitals:    10/08/24 0925   BP: 108/70   Pulse: 115   SpO2: 95%         Physical Exam:     General Appearance:    Alert, cooperative, in no acute distress   Head:    Normocephalic, without obvious abnormality, atraumatic   Eyes:            Lids and lashes normal, conjunctivae and sclerae normal, no   icterus, no pallor, corneas clear, PERRLA   Throat:   No oral lesions,  oral mucosa moist   Neck:   No adenopathy, supple,  no thyromegaly, no carotid bruit   Lungs:     Clear     Heart:    Regular rhythm and normal rate   Chest Wall:    No abnormalities observed   Abdomen:     Normal bowel sounds, soft                 Extremities:   Moves all extremities well, no edema               Pulses:   Pulses palpable and equal bilaterally   Skin:   Pump site clean   Neurologic:  DTR 1+, able to feel the 10g monofilament          Results Review:    I have reviewed the patient's new clinical results, labs &  "imaging.    Medication Review:   Prior to Admission medications    Medication Sig Start Date End Date Taking? Authorizing Provider   atorvastatin (LIPITOR) 10 MG tablet Take 1 tablet by mouth Daily. 4/1/24  Yes Ken Roberts DNP, APRN   Continuous Blood Gluc Sensor (Dexcom G6 Sensor) Use Every 10 (Ten) Days. 4/5/24  Yes Bernarda Henry MD   Continuous Blood Gluc Transmit (Dexcom G6 Transmitter) misc Use 1 Device Continuous. 4/5/24  Yes Bernarda Henry MD   Contour Next Test test strip CHECK BLOOD SUGAR FOUR TIMES A DAY BEFORE MEALS AND AT BEDTIME 1/13/22  Yes Bernarda Henry MD   Glucagon (Baqsimi One Pack) 3 MG/DOSE powder 3 mg into the nostril(s) as directed by provider As Needed (for severe hypoglycemic episode). 9/18/23  Yes Bernarda Henry MD   Insulin Disposable Pump (Omnipod 5 G6 Pods, Gen 5,) misc Use 1 each Every Other Day. E10.65 5/22/24  Yes Bernarda Henry MD   Insulin Syringe-Needle U-100 (GNP Insulin Syringes 31Gx5/16\") 31G X 5/16\" 0.3 ML misc For insulin injections daily. 8/9/22  Yes Bernarda Henry MD   lisinopril (PRINIVIL,ZESTRIL) 10 MG tablet Take 1 tablet by mouth Daily. 4/1/24  Yes Ken Roberts DNP, APRN   montelukast (SINGULAIR) 10 MG tablet Take 1 tablet by mouth Every Night. 4/1/24  Yes Ken Roberts DNP, APRN   NovoLOG 100 UNIT/ML injection USE AS DIRECTED IN INSULIN PUMP; MAXIMUM DOSE 100 UNITS PER DAY 5/15/24  Yes Bernarda Henry MD   Nutritional Supplements (VITAMIN D BOOSTER PO) Take 2,000 Units by mouth.   Yes Giselle cMbride MD   Taclonex 0.005-0.064 % external suspension  12/14/21  Yes Giselle Mcbride MD   fexofenadine-pseudoephedrine (Allegra-D Allergy & Congestion) 180-240 MG per 24 hr tablet Take 1 tablet by mouth Daily.  Patient not taking: Reported on 10/15/2024 4/1/24 10/20/24 Yes Ken Roberts DNP, DELON   Zepbound 7.5 MG/0.5ML solution auto-injector INJECT 7.5 MG UNDER THE SKIN ONCE WEEKLY 10/11/24   Ken Roberts DNP, DELON         Lab Results "   Component Value Date    HGBA1C 6.5 (H) 10/17/2024    HGBA1C 6.58 (H) 09/28/2024    HGBA1C 7.20 (H) 03/28/2024      Lab Results   Component Value Date    GLUCOSE 188 (H) 10/17/2024    BUN 6 10/17/2024    CREATININE 0.80 10/17/2024    EGFRIFNONA 72 12/22/2021    BCR 8 (L) 10/17/2024    K 4.0 10/17/2024    CO2 22 10/17/2024    CALCIUM 9.1 10/17/2024    PROTENTOTREF 6.4 10/17/2024    ALBUMIN 4.1 10/17/2024    LABIL2 1.3 09/25/2018    AST 15 10/17/2024    ALT 16 10/17/2024    CHOL 147 03/28/2024    LDL 66 10/17/2024    HDL 43 10/17/2024    TRIG 137 10/17/2024     Lab Results   Component Value Date    TSH 0.811 10/17/2024    FREET4 1.28 10/17/2024    [id number] 27.6 (L) 09/28/2024       Assessment & Plan     Diagnoses and all orders for this visit:    1. Type 1 diabetes mellitus with hyperglycemia (Primary) - improved  -     Hemoglobin A1c; Future  -     Microalbumin / Creatinine Urine Ratio - Urine, Clean Catch; Future  -     Lipid Panel; Future    2. Vitamin D deficiency - not @ goal    3. Insulin pump status    4. Hyperlipidemia, unspecified hyperlipidemia type - will check status next visit  -     Comprehensive Metabolic Panel; Future  -     Lipid Panel; Future  -     TSH; Future    Wearing OmniPod 5 insulin pump since June 2024.   Pt has agreed to wear the CGM device and share readings on a regular basis with our office    See eye doctor.  Continue exercise.  Adjust current wt on pump settings.  Restart vit D supplements.  Continue atorvastatin.  Call if blood sugars are running under 100 or over 200.        Bernarda Henry MD  10/20/24  16:19 EDT

## 2024-11-09 DIAGNOSIS — E66.9 OBESITY: ICD-10-CM

## 2024-11-10 RX ORDER — TIRZEPATIDE 7.5 MG/.5ML
INJECTION, SOLUTION SUBCUTANEOUS
Qty: 2 ML | Refills: 0 | Status: SHIPPED | OUTPATIENT
Start: 2024-11-10

## 2024-11-21 DIAGNOSIS — Z91.09 ENVIRONMENTAL ALLERGIES: ICD-10-CM

## 2024-11-21 RX ORDER — FEXOFENADINE HYDROCHLORIDE AND PSEUDOEPHEDRINE HYDROCHLORIDE 180; 240 MG/1; MG/1
1 TABLET, FILM COATED, EXTENDED RELEASE ORAL DAILY
Qty: 30 TABLET | Refills: 5 | Status: SHIPPED | OUTPATIENT
Start: 2024-11-21

## 2024-11-24 DIAGNOSIS — Z91.09 ENVIRONMENTAL ALLERGIES: ICD-10-CM

## 2024-11-24 RX ORDER — MONTELUKAST SODIUM 10 MG/1
10 TABLET ORAL NIGHTLY
Qty: 90 TABLET | Refills: 1 | Status: SHIPPED | OUTPATIENT
Start: 2024-11-24

## 2024-12-13 RX ORDER — INSULIN ASPART 100 [IU]/ML
INJECTION, SOLUTION INTRAVENOUS; SUBCUTANEOUS
Qty: 90 ML | Refills: 3 | Status: SHIPPED | OUTPATIENT
Start: 2024-12-13

## 2024-12-18 DIAGNOSIS — I10 HYPERTENSION, UNSPECIFIED TYPE: ICD-10-CM

## 2024-12-18 RX ORDER — LISINOPRIL 10 MG/1
10 TABLET ORAL DAILY
Qty: 90 TABLET | Refills: 1 | Status: SHIPPED | OUTPATIENT
Start: 2024-12-18

## 2024-12-21 DIAGNOSIS — E66.9 OBESITY: ICD-10-CM

## 2024-12-24 RX ORDER — TIRZEPATIDE 7.5 MG/.5ML
INJECTION, SOLUTION SUBCUTANEOUS
Qty: 2 ML | Refills: 0 | Status: SHIPPED | OUTPATIENT
Start: 2024-12-24

## 2025-01-10 ENCOUNTER — TELEPHONE (OUTPATIENT)
Dept: ENDOCRINOLOGY | Facility: CLINIC | Age: 55
End: 2025-01-10
Payer: COMMERCIAL

## 2025-01-12 DIAGNOSIS — E10.65 TYPE 1 DIABETES MELLITUS WITH HYPERGLYCEMIA: ICD-10-CM

## 2025-01-13 RX ORDER — ACYCLOVIR 400 MG/1
TABLET ORAL
Qty: 6 EACH | Refills: 6 | Status: SHIPPED | OUTPATIENT
Start: 2025-01-13

## 2025-01-22 DIAGNOSIS — E10.65 TYPE 1 DIABETES MELLITUS WITH HYPERGLYCEMIA: Primary | ICD-10-CM

## 2025-01-22 DIAGNOSIS — E66.3 OVERWEIGHT (BMI 25.0-29.9): ICD-10-CM

## 2025-03-16 DIAGNOSIS — E78.5 HYPERLIPIDEMIA, UNSPECIFIED HYPERLIPIDEMIA TYPE: ICD-10-CM

## 2025-03-16 RX ORDER — ATORVASTATIN CALCIUM 10 MG/1
10 TABLET, FILM COATED ORAL DAILY
Qty: 90 TABLET | Refills: 2 | Status: SHIPPED | OUTPATIENT
Start: 2025-03-16

## 2025-03-19 ENCOUNTER — PRIOR AUTHORIZATION (OUTPATIENT)
Dept: INTERNAL MEDICINE | Age: 55
End: 2025-03-19
Payer: COMMERCIAL

## 2025-04-20 DIAGNOSIS — E10.65 TYPE 1 DIABETES MELLITUS WITH HYPERGLYCEMIA: ICD-10-CM

## 2025-04-20 DIAGNOSIS — E66.3 OVERWEIGHT (BMI 25.0-29.9): ICD-10-CM

## 2025-05-01 ENCOUNTER — PRIOR AUTHORIZATION (OUTPATIENT)
Dept: INTERNAL MEDICINE | Age: 55
End: 2025-05-01
Payer: COMMERCIAL

## 2025-05-19 DIAGNOSIS — Z91.09 ENVIRONMENTAL ALLERGIES: ICD-10-CM

## 2025-05-19 RX ORDER — MONTELUKAST SODIUM 10 MG/1
10 TABLET ORAL NIGHTLY
Qty: 90 TABLET | Refills: 1 | Status: SHIPPED | OUTPATIENT
Start: 2025-05-19

## 2025-06-01 DIAGNOSIS — E10.65 TYPE 1 DIABETES MELLITUS WITH HYPERGLYCEMIA: ICD-10-CM

## 2025-06-02 RX ORDER — INSULIN PMP CART,AUT,G6/7,CNTR
EACH SUBCUTANEOUS
Qty: 10 EACH | Refills: 1 | Status: SHIPPED | OUTPATIENT
Start: 2025-06-02

## 2025-06-09 DIAGNOSIS — Z91.09 ENVIRONMENTAL ALLERGIES: ICD-10-CM

## 2025-06-09 RX ORDER — FEXOFENADINE HYDROCHLORIDE AND PSEUDOEPHEDRINE HYDROCHLORIDE 180; 240 MG/1; MG/1
1 TABLET, FILM COATED, EXTENDED RELEASE ORAL DAILY
Qty: 30 TABLET | Refills: 5 | Status: SHIPPED | OUTPATIENT
Start: 2025-06-09

## 2025-06-13 DIAGNOSIS — I10 HYPERTENSION, UNSPECIFIED TYPE: ICD-10-CM

## 2025-06-13 RX ORDER — LISINOPRIL 10 MG/1
10 TABLET ORAL DAILY
Qty: 90 TABLET | Refills: 1 | Status: SHIPPED | OUTPATIENT
Start: 2025-06-13

## 2025-07-09 ENCOUNTER — LAB (OUTPATIENT)
Dept: LAB | Facility: HOSPITAL | Age: 55
End: 2025-07-09
Payer: COMMERCIAL

## 2025-07-09 DIAGNOSIS — E78.5 HYPERLIPIDEMIA, UNSPECIFIED HYPERLIPIDEMIA TYPE: ICD-10-CM

## 2025-07-09 DIAGNOSIS — E10.65 TYPE 1 DIABETES MELLITUS WITH HYPERGLYCEMIA: ICD-10-CM

## 2025-07-09 LAB
ALBUMIN SERPL-MCNC: 4.2 G/DL (ref 3.5–5.2)
ALBUMIN UR-MCNC: 3 MG/DL
ALBUMIN/GLOB SERPL: 1.6 G/DL
ALP SERPL-CCNC: 64 U/L (ref 39–117)
ALT SERPL W P-5'-P-CCNC: 16 U/L (ref 1–33)
ANION GAP SERPL CALCULATED.3IONS-SCNC: 9.6 MMOL/L (ref 5–15)
AST SERPL-CCNC: 18 U/L (ref 1–32)
BILIRUB SERPL-MCNC: 0.6 MG/DL (ref 0–1.2)
BUN SERPL-MCNC: 7 MG/DL (ref 6–20)
BUN/CREAT SERPL: 8.4 (ref 7–25)
CALCIUM SPEC-SCNC: 9.3 MG/DL (ref 8.6–10.5)
CHLORIDE SERPL-SCNC: 102 MMOL/L (ref 98–107)
CHOLEST SERPL-MCNC: 121 MG/DL (ref 0–200)
CO2 SERPL-SCNC: 28.4 MMOL/L (ref 22–29)
CREAT SERPL-MCNC: 0.83 MG/DL (ref 0.57–1)
CREAT UR-MCNC: 48.2 MG/DL
EGFRCR SERPLBLD CKD-EPI 2021: 84.9 ML/MIN/1.73
GLOBULIN UR ELPH-MCNC: 2.7 GM/DL
GLUCOSE SERPL-MCNC: 199 MG/DL (ref 65–99)
HBA1C MFR BLD: 7.53 % (ref 4.8–5.6)
HDLC SERPL-MCNC: 53 MG/DL (ref 40–60)
LDLC SERPL CALC-MCNC: 56 MG/DL (ref 0–100)
LDLC/HDLC SERPL: 1.08 {RATIO}
MICROALBUMIN/CREAT UR: 62.2 MG/G (ref 0–29)
POTASSIUM SERPL-SCNC: 4.8 MMOL/L (ref 3.5–5.2)
PROT SERPL-MCNC: 6.9 G/DL (ref 6–8.5)
SODIUM SERPL-SCNC: 140 MMOL/L (ref 136–145)
TRIGL SERPL-MCNC: 55 MG/DL (ref 0–150)
TSH SERPL DL<=0.05 MIU/L-ACNC: 0.67 UIU/ML (ref 0.27–4.2)
VLDLC SERPL-MCNC: 12 MG/DL (ref 5–40)

## 2025-07-09 PROCEDURE — 36415 COLL VENOUS BLD VENIPUNCTURE: CPT

## 2025-07-09 PROCEDURE — 82043 UR ALBUMIN QUANTITATIVE: CPT

## 2025-07-09 PROCEDURE — 82570 ASSAY OF URINE CREATININE: CPT

## 2025-07-09 PROCEDURE — 84443 ASSAY THYROID STIM HORMONE: CPT

## 2025-07-09 PROCEDURE — 83036 HEMOGLOBIN GLYCOSYLATED A1C: CPT

## 2025-07-09 PROCEDURE — 80053 COMPREHEN METABOLIC PANEL: CPT

## 2025-07-09 PROCEDURE — 80061 LIPID PANEL: CPT

## 2025-07-16 ENCOUNTER — OFFICE VISIT (OUTPATIENT)
Dept: ENDOCRINOLOGY | Facility: CLINIC | Age: 55
End: 2025-07-16
Payer: COMMERCIAL

## 2025-07-16 VITALS
OXYGEN SATURATION: 98 % | HEIGHT: 66 IN | BODY MASS INDEX: 24.59 KG/M2 | DIASTOLIC BLOOD PRESSURE: 70 MMHG | WEIGHT: 153 LBS | SYSTOLIC BLOOD PRESSURE: 110 MMHG | HEART RATE: 100 BPM

## 2025-07-16 DIAGNOSIS — Z96.41 INSULIN PUMP STATUS: ICD-10-CM

## 2025-07-16 DIAGNOSIS — E10.65 TYPE 1 DIABETES MELLITUS WITH HYPERGLYCEMIA: Primary | ICD-10-CM

## 2025-07-16 DIAGNOSIS — E55.9 VITAMIN D DEFICIENCY: ICD-10-CM

## 2025-07-16 DIAGNOSIS — E78.2 MIXED HYPERLIPIDEMIA: ICD-10-CM

## 2025-07-16 PROCEDURE — 99214 OFFICE O/P EST MOD 30 MIN: CPT | Performed by: INTERNAL MEDICINE

## 2025-07-16 RX ORDER — ACYCLOVIR 400 MG/1
1 TABLET ORAL
Qty: 9 EACH | Refills: 3 | Status: SHIPPED | OUTPATIENT
Start: 2025-07-16

## 2025-07-16 RX ORDER — INSULIN PMP CART,AUT,G6/7,CNTR
1 EACH SUBCUTANEOUS
Qty: 30 EACH | Refills: 3 | Status: SHIPPED | OUTPATIENT
Start: 2025-07-16

## 2025-07-16 NOTE — PATIENT INSTRUCTIONS
Continue exercise.  Continue same pump settings, atorvastatin & vit D (after the summer).  Bolus before eating.  Call if blood sugars are running under 100 or over 200.  F/u in 6 months, with labs prior.

## 2025-07-20 NOTE — PROGRESS NOTES
[facility]        Patient Care Team:  Ken Roberts, DNP, APRN as PCP - General (Internal Medicine)  Bernarda Henry MD as Consulting Physician (Endocrinology)  Martha Smith MD (Obstetrics and Gynecology)  Riya Busby MD as Consulting Physician (Dermatology)    Chief Complaint:    Chief Complaint   Patient presents with    Diabetes     FU / DM 1 /  4hpp         Subjective   Here for diabetes f/u  Blood sugars: high 100's  Exercise program: walking  Off vit D for the summer    Interval History:     Patient Comments: stressed @ work w moving site  Patient Denies: hypoglycemia  History taken from: patient    Review of Systems:   Review of Systems   Eyes:  Negative for blurred vision.   Gastrointestinal:  Negative for nausea.   Endocrine: Negative for polyuria.   Neurological:  Negative for headache.   Psychiatric/Behavioral:  Positive for stress.    Gained 1 lb since last visit    Objective     Vital Signs     Vitals:    07/16/25 1509   BP: 110/70   Pulse: 100   SpO2: 98%         Physical Exam:     General Appearance:    Alert, cooperative, in no acute distress   Head:    Normocephalic, without obvious abnormality, atraumatic   Eyes:            Lids and lashes normal, conjunctivae and sclerae normal, no   icterus, no pallor, corneas clear, PERRLA   Throat:   No oral lesions,  oral mucosa moist   Neck:   No adenopathy, supple,  no thyromegaly, no carotid bruit   Lungs:     Clear    Heart:    Regular rhythm and normal rate   Chest Wall:    No abnormalities observed   Abdomen:     Normal bowel sounds, soft                 Extremities:   Moves all extremities well, no edema               Pulses:   Pulses palpable and equal bilaterally   Skin:   Pump site clean   Neurologic:  DTR 1+ in ankles, normal vibratory sense, able to feel the 10g monofilament          Results Review:    I have reviewed the patient's new clinical results, labs & imaging.    Medication Review:   Prior to  "Admission medications    Medication Sig Start Date End Date Taking? Authorizing Provider   Allegra-D Allergy & Congestion 180-240 MG per 24 hr tablet TAKE 1 TABLET BY MOUTH DAILY 6/9/25  Yes Ken Roberts DNP, APRN   atorvastatin (LIPITOR) 10 MG tablet TAKE 1 TABLET BY MOUTH DAILY 3/16/25  Yes Ken Roberts DNP, APRN   Continuous Glucose Sensor (Dexcom G7 Sensor) misc Inject 1 Device under the skin into the appropriate area as directed Every 10 (Ten) Days. 7/16/25  Yes Bernarda Henry MD   Contour Next Test test strip CHECK BLOOD SUGAR FOUR TIMES A DAY BEFORE MEALS AND AT BEDTIME 1/13/22  Yes Bernarda Henry MD   Glucagon (Baqsimi One Pack) 3 MG/DOSE powder 3 mg into the nostril(s) as directed by provider As Needed (for severe hypoglycemic episode). 9/18/23  Yes Bernarda Henry MD   Insulin Disposable Pump (Omnipod 5 [id number] Pods Gen 5) misc 1 Device by Subcutaneous Infusion route Every 72 (Seventy-Two) Hours. 7/16/25  Yes Bernarda Henry MD   Insulin Syringe-Needle U-100 (GNP Insulin Syringes 31Gx5/16\") 31G X 5/16\" 0.3 ML misc For insulin injections daily. 8/9/22  Yes Bernarda Henry MD   lisinopril (PRINIVIL,ZESTRIL) 10 MG tablet TAKE 1 TABLET BY MOUTH DAILY 6/13/25  Yes Ken Roberts DNP, APRN   montelukast (SINGULAIR) 10 MG tablet TAKE ONE TABLET BY MOUTH ONCE NIGHTLY 5/19/25  Yes Ken Roberts DNP, APRN   NovoLOG 100 UNIT/ML injection USE AS DIRECTED IN INSULIN PUMP; MAXIMUM 100 UNITS DAILY 12/13/24  Yes Bernarda Henry MD   Nutritional Supplements (VITAMIN D BOOSTER PO) Take 2,000 Units by mouth.   Yes Giselle Mcbride MD   Taclonex 0.005-0.064 % external suspension  12/14/21  Yes Giselle Mcbride MD   Tirzepatide-Weight Management (ZEPBOUND) 5 MG/0.5ML solution auto-injector Inject 0.5 mL under the skin into the appropriate area as directed 1 (One) Time Per Week. 4/20/25  Yes Ken Roberts, POP, APRN         Lab Results   Component Value Date    HGBA1C 7.53 (H) 07/09/2025    HGBA1C " 6.5 (H) 10/17/2024    HGBA1C 6.58 (H) 09/28/2024      Lab Results   Component Value Date    GLUCOSE 199 (H) 07/09/2025    BUN 7.0 07/09/2025    CREATININE 0.83 07/09/2025    EGFRIFNONA 72 12/22/2021    BCR 8.4 07/09/2025    K 4.8 07/09/2025    CO2 28.4 07/09/2025    CALCIUM 9.3 07/09/2025    ALBUMIN 4.2 07/09/2025    AST 18 07/09/2025    ALT 16 07/09/2025    CHOL 121 07/09/2025    LDL 56 07/09/2025    HDL 53 07/09/2025    TRIG 55 07/09/2025     Lab Results   Component Value Date    TSH 0.668 07/09/2025    FREET4 1.28 10/17/2024    BAKQ13AP 27.6 (L) 09/28/2024     Microalb/cr ratio 62.2    Assessment & Plan     Diagnoses and all orders for this visit:    1. Type 1 diabetes mellitus with hyperglycemia (Primary) - not @ goal  -     Continuous Glucose Sensor (Dexcom G7 Sensor) misc; Inject 1 Device under the skin into the appropriate area as directed Every 10 (Ten) Days.  Dispense: 9 each; Refill: 3  -     Insulin Disposable Pump (Omnipod 5 [id number] Pods Gen 5) misc; 1 Device by Subcutaneous Infusion route Every 72 (Seventy-Two) Hours.  Dispense: 30 each; Refill: 3  -     Cancel: Hemoglobin A1c; Future  -     Cancel: Hemoglobin A1c; Future  -     Cancel: Hemoglobin A1c; Future  -     Hemoglobin A1c; Future    2. Mixed hyperlipidemia - stable  -     Cancel: Comprehensive Metabolic Panel; Future  -     Cancel: Comprehensive Metabolic Panel; Future  -     Cancel: Comprehensive Metabolic Panel; Future  -     Comprehensive Metabolic Panel; Future    3. Vitamin D deficiency - will check status next visit  -     Cancel: Vitamin D,25-Hydroxy; Future  -     Cancel: Vitamin D,25-Hydroxy; Future  -     Cancel: Vitamin D,25-Hydroxy; Future  -     Vitamin D,25-Hydroxy; Future    4. Insulin pump status    Wearing OmniPod 5 insulin pump since June 2024.   Pt has agreed to wear the CGM device and share readings on a regular basis with our office.    Continue exercise.  Continue same pump settings, atorvastatin & vit D (after the  summer).  Bolus before eating.  Call if blood sugars are running under 100 or over 200.        Bernarda Henry MD  07/20/25  17:21 EDT

## 2025-08-21 ENCOUNTER — PRIOR AUTHORIZATION (OUTPATIENT)
Dept: INTERNAL MEDICINE | Age: 55
End: 2025-08-21
Payer: COMMERCIAL